# Patient Record
Sex: FEMALE | Race: WHITE | HISPANIC OR LATINO | Employment: FULL TIME | URBAN - METROPOLITAN AREA
[De-identification: names, ages, dates, MRNs, and addresses within clinical notes are randomized per-mention and may not be internally consistent; named-entity substitution may affect disease eponyms.]

---

## 2020-01-08 PROBLEM — F33.40 RECURRENT MAJOR DEPRESSIVE DISORDER, IN REMISSION (HCC): Status: ACTIVE | Noted: 2020-01-08

## 2020-05-20 PROBLEM — N84.1 CERVICAL POLYP: Status: ACTIVE | Noted: 2020-05-20

## 2020-05-20 PROBLEM — Z34.80 SUPERVISION OF OTHER NORMAL PREGNANCY, ANTEPARTUM: Status: ACTIVE | Noted: 2020-05-20

## 2020-07-29 PROBLEM — B37.31 RECURRENT CANDIDIASIS OF VAGINA: Status: ACTIVE | Noted: 2020-07-29

## 2020-09-23 PROBLEM — O47.00 PRETERM CONTRACTIONS: Status: ACTIVE | Noted: 2020-09-23

## 2020-09-25 ENCOUNTER — HOSPITAL ENCOUNTER (INPATIENT)
Facility: MEDICAL CENTER | Age: 30
LOS: 6 days | End: 2020-10-01
Attending: OBSTETRICS & GYNECOLOGY | Admitting: OBSTETRICS & GYNECOLOGY
Payer: COMMERCIAL

## 2020-09-25 DIAGNOSIS — F34.1 DYSTHYMIA: ICD-10-CM

## 2020-09-25 LAB
ABO + RH BLD: NORMAL
ABO GROUP BLD: NORMAL
ALBUMIN SERPL BCP-MCNC: 3.3 G/DL (ref 3.2–4.9)
ALBUMIN/GLOB SERPL: 0.9 G/DL
ALP SERPL-CCNC: 94 U/L (ref 30–99)
ALT SERPL-CCNC: 11 U/L (ref 2–50)
ANION GAP SERPL CALC-SCNC: 16 MMOL/L (ref 7–16)
AST SERPL-CCNC: 13 U/L (ref 12–45)
BASOPHILS # BLD AUTO: 0.2 % (ref 0–1.8)
BASOPHILS # BLD: 0.04 K/UL (ref 0–0.12)
BILIRUB SERPL-MCNC: 0.2 MG/DL (ref 0.1–1.5)
BLD GP AB SCN SERPL QL: NORMAL
BUN SERPL-MCNC: 9 MG/DL (ref 8–22)
CALCIUM SERPL-MCNC: 7.7 MG/DL (ref 8.5–10.5)
CHLORIDE SERPL-SCNC: 102 MMOL/L (ref 96–112)
CO2 SERPL-SCNC: 18 MMOL/L (ref 20–33)
COVID ORDER STATUS COVID19: NORMAL
CREAT SERPL-MCNC: 0.58 MG/DL (ref 0.5–1.4)
EOSINOPHIL # BLD AUTO: 0.01 K/UL (ref 0–0.51)
EOSINOPHIL NFR BLD: 0.1 % (ref 0–6.9)
ERYTHROCYTE [DISTWIDTH] IN BLOOD BY AUTOMATED COUNT: 44.8 FL (ref 35.9–50)
FIBRINOGEN PPP-MCNC: 698 MG/DL (ref 215–460)
GLOBULIN SER CALC-MCNC: 3.7 G/DL (ref 1.9–3.5)
GLUCOSE SERPL-MCNC: 136 MG/DL (ref 65–99)
HCT VFR BLD AUTO: 38.1 % (ref 37–47)
HGB BLD-MCNC: 13.2 G/DL (ref 12–16)
IMM GRANULOCYTES # BLD AUTO: 0.07 K/UL (ref 0–0.11)
IMM GRANULOCYTES NFR BLD AUTO: 0.4 % (ref 0–0.9)
LYMPHOCYTES # BLD AUTO: 0.86 K/UL (ref 1–4.8)
LYMPHOCYTES NFR BLD: 5.2 % (ref 22–41)
MAGNESIUM SERPL-MCNC: 4.3 MG/DL (ref 1.5–2.5)
MAGNESIUM SERPL-MCNC: 7 MG/DL (ref 1.5–2.5)
MAGNESIUM SERPL-MCNC: 8 MG/DL (ref 1.5–2.5)
MCH RBC QN AUTO: 33.2 PG (ref 27–33)
MCHC RBC AUTO-ENTMCNC: 34.6 G/DL (ref 33.6–35)
MCV RBC AUTO: 96 FL (ref 81.4–97.8)
MONOCYTES # BLD AUTO: 0.51 K/UL (ref 0–0.85)
MONOCYTES NFR BLD AUTO: 3.1 % (ref 0–13.4)
NEUTROPHILS # BLD AUTO: 15.12 K/UL (ref 2–7.15)
NEUTROPHILS NFR BLD: 91 % (ref 44–72)
NRBC # BLD AUTO: 0 K/UL
NRBC BLD-RTO: 0 /100 WBC
PLATELET # BLD AUTO: 192 K/UL (ref 164–446)
PMV BLD AUTO: 10.8 FL (ref 9–12.9)
POTASSIUM SERPL-SCNC: 3.7 MMOL/L (ref 3.6–5.5)
PROT SERPL-MCNC: 7 G/DL (ref 6–8.2)
RBC # BLD AUTO: 3.97 M/UL (ref 4.2–5.4)
RH BLD: NORMAL
SARS-COV-2 RDRP RESP QL NAA+PROBE: NOTDETECTED
SODIUM SERPL-SCNC: 136 MMOL/L (ref 135–145)
SPECIMEN SOURCE: NORMAL
WBC # BLD AUTO: 16.6 K/UL (ref 4.8–10.8)

## 2020-09-25 PROCEDURE — 302790 HCHG STAT ANTEPARTUM CARE, DAILY

## 2020-09-25 PROCEDURE — 700111 HCHG RX REV CODE 636 W/ 250 OVERRIDE (IP): Performed by: ADVANCED PRACTICE MIDWIFE

## 2020-09-25 PROCEDURE — C9803 HOPD COVID-19 SPEC COLLECT: HCPCS | Performed by: OBSTETRICS & GYNECOLOGY

## 2020-09-25 PROCEDURE — 700111 HCHG RX REV CODE 636 W/ 250 OVERRIDE (IP): Performed by: OBSTETRICS & GYNECOLOGY

## 2020-09-25 PROCEDURE — 85025 COMPLETE CBC W/AUTO DIFF WBC: CPT

## 2020-09-25 PROCEDURE — 770002 HCHG ROOM/CARE - OB PRIVATE (112)

## 2020-09-25 PROCEDURE — 86900 BLOOD TYPING SEROLOGIC ABO: CPT

## 2020-09-25 PROCEDURE — 86850 RBC ANTIBODY SCREEN: CPT

## 2020-09-25 PROCEDURE — A9270 NON-COVERED ITEM OR SERVICE: HCPCS | Performed by: OBSTETRICS & GYNECOLOGY

## 2020-09-25 PROCEDURE — 83735 ASSAY OF MAGNESIUM: CPT

## 2020-09-25 PROCEDURE — 85384 FIBRINOGEN ACTIVITY: CPT

## 2020-09-25 PROCEDURE — 700105 HCHG RX REV CODE 258: Performed by: OBSTETRICS & GYNECOLOGY

## 2020-09-25 PROCEDURE — 36415 COLL VENOUS BLD VENIPUNCTURE: CPT

## 2020-09-25 PROCEDURE — U0004 COV-19 TEST NON-CDC HGH THRU: HCPCS

## 2020-09-25 PROCEDURE — 80053 COMPREHEN METABOLIC PANEL: CPT

## 2020-09-25 PROCEDURE — 86901 BLOOD TYPING SEROLOGIC RH(D): CPT

## 2020-09-25 PROCEDURE — 700102 HCHG RX REV CODE 250 W/ 637 OVERRIDE(OP): Performed by: OBSTETRICS & GYNECOLOGY

## 2020-09-25 RX ORDER — ESCITALOPRAM OXALATE 10 MG/1
10 TABLET ORAL DAILY
Status: DISCONTINUED | OUTPATIENT
Start: 2020-09-25 | End: 2020-10-01 | Stop reason: HOSPADM

## 2020-09-25 RX ORDER — VITAMIN A ACETATE, BETA CAROTENE, ASCORBIC ACID, CHOLECALCIFEROL, .ALPHA.-TOCOPHEROL ACETATE, DL-, THIAMINE MONONITRATE, RIBOFLAVIN, NIACINAMIDE, PYRIDOXINE HYDROCHLORIDE, FOLIC ACID, CYANOCOBALAMIN, CALCIUM CARBONATE, FERROUS FUMARATE, ZINC OXIDE, CUPRIC OXIDE 3080; 12; 120; 400; 1; 1.84; 3; 20; 22; 920; 25; 200; 27; 10; 2 [IU]/1; UG/1; MG/1; [IU]/1; MG/1; MG/1; MG/1; MG/1; MG/1; [IU]/1; MG/1; MG/1; MG/1; MG/1; MG/1
1 TABLET, FILM COATED ORAL
Status: DISCONTINUED | OUTPATIENT
Start: 2020-09-25 | End: 2020-09-29

## 2020-09-25 RX ORDER — MAGNESIUM SULFATE HEPTAHYDRATE 40 MG/ML
INJECTION, SOLUTION INTRAVENOUS
Status: ACTIVE
Start: 2020-09-25 | End: 2020-09-25

## 2020-09-25 RX ORDER — SODIUM CHLORIDE, SODIUM LACTATE, POTASSIUM CHLORIDE, CALCIUM CHLORIDE 600; 310; 30; 20 MG/100ML; MG/100ML; MG/100ML; MG/100ML
INJECTION, SOLUTION INTRAVENOUS CONTINUOUS
Status: DISCONTINUED | OUTPATIENT
Start: 2020-09-25 | End: 2020-09-29

## 2020-09-25 RX ORDER — DOCUSATE SODIUM 100 MG/1
100 CAPSULE, LIQUID FILLED ORAL 2 TIMES DAILY
Status: DISCONTINUED | OUTPATIENT
Start: 2020-09-25 | End: 2020-09-29

## 2020-09-25 RX ORDER — ONDANSETRON 2 MG/ML
4 INJECTION INTRAMUSCULAR; INTRAVENOUS EVERY 6 HOURS PRN
Status: DISCONTINUED | OUTPATIENT
Start: 2020-09-25 | End: 2020-09-29

## 2020-09-25 RX ORDER — CALCIUM GLUCONATE 94 MG/ML
1 INJECTION, SOLUTION INTRAVENOUS
Status: DISCONTINUED | OUTPATIENT
Start: 2020-09-25 | End: 2020-09-29

## 2020-09-25 RX ORDER — SODIUM CHLORIDE, SODIUM LACTATE, POTASSIUM CHLORIDE, CALCIUM CHLORIDE 600; 310; 30; 20 MG/100ML; MG/100ML; MG/100ML; MG/100ML
INJECTION, SOLUTION INTRAVENOUS
Status: ACTIVE
Start: 2020-09-25 | End: 2020-09-25

## 2020-09-25 RX ORDER — INDOMETHACIN 50 MG/1
50 CAPSULE ORAL EVERY 8 HOURS
Status: COMPLETED | OUTPATIENT
Start: 2020-09-25 | End: 2020-09-28

## 2020-09-25 RX ORDER — MAGNESIUM SULFATE HEPTAHYDRATE 40 MG/ML
1.5 INJECTION, SOLUTION INTRAVENOUS CONTINUOUS
Status: DISCONTINUED | OUTPATIENT
Start: 2020-09-25 | End: 2020-09-27

## 2020-09-25 RX ORDER — ACETAMINOPHEN 325 MG/1
650 TABLET ORAL EVERY 4 HOURS PRN
Status: DISCONTINUED | OUTPATIENT
Start: 2020-09-25 | End: 2020-09-29

## 2020-09-25 RX ORDER — BETAMETHASONE SODIUM PHOSPHATE AND BETAMETHASONE ACETATE 3; 3 MG/ML; MG/ML
12 INJECTION, SUSPENSION INTRA-ARTICULAR; INTRALESIONAL; INTRAMUSCULAR; SOFT TISSUE ONCE
Status: COMPLETED | OUTPATIENT
Start: 2020-09-26 | End: 2020-09-26

## 2020-09-25 RX ORDER — SODIUM CHLORIDE, SODIUM LACTATE, POTASSIUM CHLORIDE, CALCIUM CHLORIDE 600; 310; 30; 20 MG/100ML; MG/100ML; MG/100ML; MG/100ML
1000 INJECTION, SOLUTION INTRAVENOUS CONTINUOUS
Status: DISCONTINUED | OUTPATIENT
Start: 2020-09-25 | End: 2020-09-25

## 2020-09-25 RX ADMIN — MAGNESIUM SULFATE HEPTAHYDRATE 2 G/HR: 40 INJECTION, SOLUTION INTRAVENOUS at 18:32

## 2020-09-25 RX ADMIN — INDOMETHACIN 50 MG: 50 CAPSULE ORAL at 21:56

## 2020-09-25 RX ADMIN — DOCUSATE SODIUM 100 MG: 100 CAPSULE, LIQUID FILLED ORAL at 17:07

## 2020-09-25 RX ADMIN — MAGNESIUM SULFATE HEPTAHYDRATE 3 G/HR: 40 INJECTION, SOLUTION INTRAVENOUS at 06:10

## 2020-09-25 RX ADMIN — SODIUM CHLORIDE, POTASSIUM CHLORIDE, SODIUM LACTATE AND CALCIUM CHLORIDE 1000 ML: 600; 310; 30; 20 INJECTION, SOLUTION INTRAVENOUS at 06:11

## 2020-09-25 RX ADMIN — INDOMETHACIN 50 MG: 50 CAPSULE ORAL at 17:07

## 2020-09-25 RX ADMIN — VANCOMYCIN HYDROCHLORIDE 1000 MG: 500 INJECTION, POWDER, LYOPHILIZED, FOR SOLUTION INTRAVENOUS at 15:08

## 2020-09-25 RX ADMIN — ESCITALOPRAM OXALATE 10 MG: 10 TABLET ORAL at 20:32

## 2020-09-25 SDOH — ECONOMIC STABILITY: TRANSPORTATION INSECURITY
IN THE PAST 12 MONTHS, HAS LACK OF TRANSPORTATION KEPT YOU FROM MEETINGS, WORK, OR FROM GETTING THINGS NEEDED FOR DAILY LIVING?: NO

## 2020-09-25 SDOH — ECONOMIC STABILITY: FOOD INSECURITY: WITHIN THE PAST 12 MONTHS, YOU WORRIED THAT YOUR FOOD WOULD RUN OUT BEFORE YOU GOT MONEY TO BUY MORE.: NEVER TRUE

## 2020-09-25 SDOH — ECONOMIC STABILITY: FOOD INSECURITY: WITHIN THE PAST 12 MONTHS, THE FOOD YOU BOUGHT JUST DIDN'T LAST AND YOU DIDN'T HAVE MONEY TO GET MORE.: NEVER TRUE

## 2020-09-25 SDOH — ECONOMIC STABILITY: TRANSPORTATION INSECURITY
IN THE PAST 12 MONTHS, HAS THE LACK OF TRANSPORTATION KEPT YOU FROM MEDICAL APPOINTMENTS OR FROM GETTING MEDICATIONS?: NO

## 2020-09-25 ASSESSMENT — LIFESTYLE VARIABLES
ALCOHOL_USE: NO
ON A TYPICAL DAY WHEN YOU DRINK ALCOHOL HOW MANY DRINKS DO YOU HAVE: 0
EVER FELT BAD OR GUILTY ABOUT YOUR DRINKING: NO
HAVE YOU EVER FELT YOU SHOULD CUT DOWN ON YOUR DRINKING: NO
EVER HAD A DRINK FIRST THING IN THE MORNING TO STEADY YOUR NERVES TO GET RID OF A HANGOVER: NO
AVERAGE NUMBER OF DAYS PER WEEK YOU HAVE A DRINK CONTAINING ALCOHOL: 0
HAVE PEOPLE ANNOYED YOU BY CRITICIZING YOUR DRINKING: NO
TOTAL SCORE: 0
HOW MANY TIMES IN THE PAST YEAR HAVE YOU HAD 5 OR MORE DRINKS IN A DAY: 0
EVER_SMOKED: NEVER
TOTAL SCORE: 0
CONSUMPTION TOTAL: NEGATIVE
TOTAL SCORE: 0

## 2020-09-25 ASSESSMENT — PATIENT HEALTH QUESTIONNAIRE - PHQ9
2. FEELING DOWN, DEPRESSED, IRRITABLE, OR HOPELESS: NOT AT ALL
SUM OF ALL RESPONSES TO PHQ9 QUESTIONS 1 AND 2: 0
SUM OF ALL RESPONSES TO PHQ9 QUESTIONS 1 AND 2: 0
2. FEELING DOWN, DEPRESSED, IRRITABLE, OR HOPELESS: NOT AT ALL
1. LITTLE INTEREST OR PLEASURE IN DOING THINGS: NOT AT ALL
1. LITTLE INTEREST OR PLEASURE IN DOING THINGS: NOT AT ALL

## 2020-09-25 ASSESSMENT — COPD QUESTIONNAIRES
COPD SCREENING SCORE: 0
DURING THE PAST 4 WEEKS HOW MUCH DID YOU FEEL SHORT OF BREATH: NONE/LITTLE OF THE TIME
IN THE PAST 12 MONTHS DO YOU DO LESS THAN YOU USED TO BECAUSE OF YOUR BREATHING PROBLEMS: DISAGREE/UNSURE
HAVE YOU SMOKED AT LEAST 100 CIGARETTES IN YOUR ENTIRE LIFE: NO/DON'T KNOW
DO YOU EVER COUGH UP ANY MUCUS OR PHLEGM?: NO/ONLY WITH OCCASIONAL COLDS OR INFECTIONS

## 2020-09-25 ASSESSMENT — PAIN DESCRIPTION - PAIN TYPE
TYPE: ACUTE PAIN
TYPE: ACUTE PAIN

## 2020-09-25 NOTE — PROGRESS NOTES
0520: Patient of Dr. Wolf's comes in via [employer] flight from [employer] transport C/O vaginal bleeding with contractions Q10 min apart. . Sood Gestation today at 25.5 weeks.     Patient arrives and is brought by gurney to S215 - reports contractions/cramping Q4min, bleeding on chux pad with dark small clots noted. Amnisure attempted but gross pooling found in vaginal vault during sterile speculum exam.     Magnesium 3g maintenance dose ordered - see MAR. ABX ordered; pharmacy to dose - see MAR.     Dr. Wolf notified by phone and on his way to hospital to see patient.     0686: Report given to Whitney WELSH RN.

## 2020-09-25 NOTE — PROGRESS NOTES
0700- Bedside report received from Maribel RN- poc discussed  0715- pt resting stable on mag at this time, having small amount of bleeding still, looks like small amount of clear fluid with the blood  0745- Dr. Wolf at bedside will continue to watch  0945- pt felt like needing to have a BM, unable to have one, clear fluid with blood tinge  1400- Report given to Jina NAYAK- poc discussed

## 2020-09-25 NOTE — H&P
DATE OF ADMISSION:  2020     REASON FOR TRANSPORT:  Placental abruption,  labor.      REFERRING PHYSICIAN:  Dr. Nowak.      REFERRING INSTITUTION:  [facility].      HISTORY OF PRESENT ILLNESS:  This is a 30-year-old , AB1, EDC 2021,   currently 25 weeks and 5 days, transferred for probable placental abruption   with secondary  labor.  Patient reports that her prenatal course had   been unremarkable until  at which time she began to have some pelvic   cramping and spotting, was seen at the hospital and observed and subsequently   discharged.  She was advised to drink water and rest.  Patient reports   yesterday throughout most of the day, she had increasing symptoms and felt   that she was marco a approximately 10 times per hour.  Bleeding increased   at approximately 9:30-10:00 last night as well as the contractions and she   presented to the hospital.  She was seen by Dr. Nowak and diagnosis of   placental abruption was made and patient is vertex and transport was   requested.  She was initiated on IV magnesium sulfate.  First dose   betamethasone was administered.  Vancomycin was administered for GBS   prophylaxis.      Transport occurred without incident.  Patient arrived safely.  Upon arrival,   still having bleeding, but appears to have decreased significantly.      PAST MEDICAL HISTORY:  She denies any major medical problems other than   history of depression.  She denies any history of asthma, seizures,   hypertension, cardiovascular, GI or  diseases.      PREVIOUS OPERATIONS:  As a child, she had an inguinal hernia repair.      TRANSFUSIONS:  None.      ALLERGIES:  AMOXICILLIN CAUSES A RASH.  BACTRIM CAUSES A RASH.      VENEREAL DISEASE HISTORY:  Negative.      HABITS:  None.      CURRENT MEDICATIONS:  Lexapro 10 for depression.  She is trying to wean   herself off the Lexapro.      PAST OBSTETRICAL HISTORY:  In , she had a spontaneous AB at 10 weeks.       PHYSICAL EXAMINATION:    VITAL SIGNS:  All within normal limits.  She is afebrile.  Pulse 82.    HEAD:  Normocephalic.    EYES:  No scleral icterus or subconjunctival pallor.  Pupils equal, react to   light and accommodation.  Extraocular movements symmetrical.    EAR, NOSE AND THROAT:  Grossly within normal limits.    LUNGS:  Clear.    HEART:  Regular rate and rhythm.  Normal S1 and S2.  No S3, S4 or murmurs.    ABDOMEN:  Soft, gravid uterus.  Minimal tenderness.    EXTREMITIES:  No edema or varicosities.    PELVIC:  Exam by the RN on duty was 1 cm and 80% effaced.      ASSESSMENT:    1.  Intrauterine pregnancy 25 weeks and 5 days.    2.  Vaginal bleeding secondary to placental abruption.    3.   labor, probably secondary to placental abruption.      PLAN:  At the present time, stabilize the patient and primary goal is to   complete the steroid window.  We discussed the role of magnesium sulfate for   neuro protection, but also secondary benefit for tocolysis.      We discussed that she may be here for long-term; however, it is difficult to   predict at this time.  We will take this in a step by step process and we will   proceed with neonatology consultation later today.  All questions answered to   their satisfaction.    Time: 100 minutes       ____________________________________     MD CHEN WOLFE / NOEMÍ    DD:  2020 08:19:47  DT:  2020 08:46:18    D#:  [id number]  Job#:  [id number]

## 2020-09-25 NOTE — PROGRESS NOTES
EDC - 1/3/21 EGA - 25.5    1400 - Report received from SELINA Horner RN. POC discussed, care assumed.   1515 - admit profile completed. Pt reports feeling leaking, upon inspection, approx 100ml jadiel dark red blood noted on pad and pt perineum pericare provided with new pad.  1528 - text to Dr. Wolf-awaiting return.  1540 - Hereb from Lab called with critical result of Magnesium at 8.0. Critical lab result read back to Hereb.   Dr. Wolf called to notify of critical lab result at 1541. Unable to leave message-mailbox full    1600 -Call to answering service to reach Dr. Wolf for update-answering service unable to reach physician.  1608 - MINNIE Wolf CNM called and given update on pt labs, bleeding and pressure. Orders received.  1619 - Dr. Wolf returned text- MD to come to bedside in 15 min.   1655 - Dr. Wolf at bedside. SVE=1/90/0. Monitors removed for bedside US. Orders received for indocin and to turn off magnesium for 30min.  1721 - pt transferred to S2 via bed in stable condition with FOB and personal belongings.   1725 - Magnesium infusion restarted  1830 - pt resting in bed. No complaints at this time.   185 - report given to GABY Arthur RN. POC discussed

## 2020-09-26 LAB
MAGNESIUM SERPL-MCNC: 6.2 MG/DL (ref 1.5–2.5)
MAGNESIUM SERPL-MCNC: 6.3 MG/DL (ref 1.5–2.5)

## 2020-09-26 PROCEDURE — 36415 COLL VENOUS BLD VENIPUNCTURE: CPT

## 2020-09-26 PROCEDURE — 770002 HCHG ROOM/CARE - OB PRIVATE (112)

## 2020-09-26 PROCEDURE — 83735 ASSAY OF MAGNESIUM: CPT

## 2020-09-26 PROCEDURE — 700111 HCHG RX REV CODE 636 W/ 250 OVERRIDE (IP): Performed by: OBSTETRICS & GYNECOLOGY

## 2020-09-26 PROCEDURE — 700105 HCHG RX REV CODE 258: Performed by: OBSTETRICS & GYNECOLOGY

## 2020-09-26 PROCEDURE — 700102 HCHG RX REV CODE 250 W/ 637 OVERRIDE(OP): Performed by: OBSTETRICS & GYNECOLOGY

## 2020-09-26 PROCEDURE — 302790 HCHG STAT ANTEPARTUM CARE, DAILY

## 2020-09-26 PROCEDURE — A9270 NON-COVERED ITEM OR SERVICE: HCPCS | Performed by: OBSTETRICS & GYNECOLOGY

## 2020-09-26 RX ADMIN — MAGNESIUM SULFATE HEPTAHYDRATE 1.5 G/HR: 40 INJECTION, SOLUTION INTRAVENOUS at 18:40

## 2020-09-26 RX ADMIN — INDOMETHACIN 50 MG: 50 CAPSULE ORAL at 06:14

## 2020-09-26 RX ADMIN — PRENATAL WITH FERROUS FUM AND FOLIC ACID 1 TABLET: 3080; 920; 120; 400; 22; 1.84; 3; 20; 10; 1; 12; 200; 27; 25; 2 TABLET ORAL at 08:56

## 2020-09-26 RX ADMIN — DOCUSATE SODIUM 100 MG: 100 CAPSULE, LIQUID FILLED ORAL at 06:14

## 2020-09-26 RX ADMIN — INDOMETHACIN 50 MG: 50 CAPSULE ORAL at 22:02

## 2020-09-26 RX ADMIN — INDOMETHACIN 50 MG: 50 CAPSULE ORAL at 14:10

## 2020-09-26 RX ADMIN — DOCUSATE SODIUM 100 MG: 100 CAPSULE, LIQUID FILLED ORAL at 18:02

## 2020-09-26 RX ADMIN — SODIUM CHLORIDE, POTASSIUM CHLORIDE, SODIUM LACTATE AND CALCIUM CHLORIDE: 600; 310; 30; 20 INJECTION, SOLUTION INTRAVENOUS at 18:40

## 2020-09-26 RX ADMIN — VANCOMYCIN HYDROCHLORIDE 1000 MG: 500 INJECTION, POWDER, LYOPHILIZED, FOR SOLUTION INTRAVENOUS at 04:06

## 2020-09-26 RX ADMIN — BETAMETHASONE SODIUM PHOSPHATE AND BETAMETHASONE ACETATE 12 MG: 3; 3 INJECTION, SUSPENSION INTRA-ARTICULAR; INTRALESIONAL; INTRAMUSCULAR at 02:51

## 2020-09-26 RX ADMIN — ESCITALOPRAM OXALATE 10 MG: 10 TABLET ORAL at 22:02

## 2020-09-26 RX ADMIN — SODIUM CHLORIDE, POTASSIUM CHLORIDE, SODIUM LACTATE AND CALCIUM CHLORIDE: 600; 310; 30; 20 INJECTION, SOLUTION INTRAVENOUS at 01:39

## 2020-09-26 ASSESSMENT — PAIN DESCRIPTION - PAIN TYPE: TYPE: REFERRED PAIN

## 2020-09-26 NOTE — PROGRESS NOTES
1300 Report received from JUSTUS Eng RN at bedside and assumed care. POC discussed with pt and s/o and encouraged to state needs or questions at any time.    1900 Report given to CHARO Burch RN at bedside

## 2020-09-26 NOTE — PROGRESS NOTES
S:  Pt reporting more cramping and bleeding this afternoon.    O: Afebrile        Cx: 1 cm/90%/0, vertex.         ULTRASOUND: Single live IUP, vertex, female,  gm.  Posterior placenta.  Fetal anatomy appropriate for age.  [place] 7.5 cm.  BPP 8/8.       EFM: Minimal variability.  No decelerations.  Accelerations appropriate for age.  Irregular contractions.    A: IUP 25 5/7 weeks      Placenta abruption.  P: Will add indomethacin 50 mg every 8 hours.       Continue with close observation.    Time: 15 minutes.

## 2020-09-26 NOTE — PROGRESS NOTES
"Late Entry  185) Report received from JUSTUS Eng RN, POC discussed, assumed care of patient at this time  1930) Assessment performed. Patient is a  EDC 1/3 which makes her 25.5 weeks. Patient denies any vaginal bleeding since the 1400, LOF or bleeding. Patient is having intermittent contractions and is feeling one every hour. Patient has no abdomen tenderness. States positive fetal movement.  ) Manju from lab called with a critical magnesium result of 7.0, results read back to lab to verify  ) Report to Dr. Wolf concerning patient's mag level, orders received to decrease magnesium from 2g/hr to 1.5g/hr, orders verified with MD and carried out. Patient updated concerning POC and verbalizes understanding.  ) Patient resting on and off in bed with no s/s of distress noted, respirations even and unlabored, safety precautions remain in place  5) Manju from lab called with a critical magnesium result of 6.3, results read back to lab and now within parameters per Dr. Wolf  0251) Second betamethasone injection administered per MD order, patient tolerated injection well, ice pack provided for right vastus lateralis. Scant amount of dark red blood noted on perineum, becky care provided, pads changed.   0510) Patient has had two episodes of leaking urine while still having catheter in place. Pads notably soaked with urine x 2. Patient states \"I feel like I have to pee and then I release and feel it coming out\", patient also states this has been happening ever since catheter was placed. will update MD in AM  0700) Report to JUSTUS Eng RN, POC discussed.  "

## 2020-09-26 NOTE — PROGRESS NOTES
EDC - 1/3/21 EGA - 25.6    0700 - Report received from GABY Arthur RN. POC discussed, care assumed.   0720 - Brooke from Lab called with critical result of Magnesium at 6.2. Critical lab result read back to Brooke.   This critical lab result is within parameters established by  for this patient  0754 -  Full Assessment complete. VSS. No complaints of contractions, ROM, at this time. Pt reports occasional small amount of dark red blood Pt reports good FM. POC discussed with pt and family members, all questions answered.   0900 - pt resting comfortably at this time. No needs or complaints.   1132 - EFM adjusted. VSS. Pt denies cramping, bleeding at this time. Reports good FM.  1300 - report given to GALI Prajapati RN. POC discussed.

## 2020-09-26 NOTE — PROGRESS NOTES
S: No complaints.  No bleeding at this time and no contractions.  O: Afebrile       BP: 107/63 mmHg       EFM: Moderate variability.  Accelerations present.  No contractions.  A: IUP 25 6/7 weeks      Placental abruption       labor, stable.  P: D/C magnesium tomorrow.    Time: 15 minutes.

## 2020-09-26 NOTE — CARE PLAN
Problem: Risk for Infection, Impaired Wound Healing  Goal: Remain free from signs and symptoms of infection  Note: Assessing and monitoring patient for s/s of infection and implementing precautions as needed. Educating patient and family concerning importance of hand hygiene     Problem: Pain  Goal: Alleviation of Pain or a reduction in pain to the patient's comfort goal  Outcome: PROGRESSING AS EXPECTED  Note: Assessing and monitoring patient's pain and implementing pharmacologic and nonpharmacologic means of pain management as needed. Educating patient concerning pain management options

## 2020-09-27 PROCEDURE — 770002 HCHG ROOM/CARE - OB PRIVATE (112)

## 2020-09-27 PROCEDURE — 59025 FETAL NON-STRESS TEST: CPT

## 2020-09-27 PROCEDURE — A9270 NON-COVERED ITEM OR SERVICE: HCPCS | Performed by: OBSTETRICS & GYNECOLOGY

## 2020-09-27 PROCEDURE — 700102 HCHG RX REV CODE 250 W/ 637 OVERRIDE(OP): Performed by: OBSTETRICS & GYNECOLOGY

## 2020-09-27 PROCEDURE — 302790 HCHG STAT ANTEPARTUM CARE, DAILY

## 2020-09-27 RX ORDER — CALCIUM CARBONATE 500 MG/1
1000 TABLET, CHEWABLE ORAL 4 TIMES DAILY PRN
Status: DISCONTINUED | OUTPATIENT
Start: 2020-09-27 | End: 2020-09-29 | Stop reason: HOSPADM

## 2020-09-27 RX ADMIN — PRENATAL WITH FERROUS FUM AND FOLIC ACID 1 TABLET: 3080; 920; 120; 400; 22; 1.84; 3; 20; 10; 1; 12; 200; 27; 25; 2 TABLET ORAL at 09:47

## 2020-09-27 RX ADMIN — ANTACID TABLETS 1000 MG: 500 TABLET, CHEWABLE ORAL at 23:31

## 2020-09-27 RX ADMIN — ESCITALOPRAM OXALATE 10 MG: 10 TABLET ORAL at 20:39

## 2020-09-27 RX ADMIN — INDOMETHACIN 50 MG: 50 CAPSULE ORAL at 14:19

## 2020-09-27 RX ADMIN — INDOMETHACIN 50 MG: 50 CAPSULE ORAL at 21:52

## 2020-09-27 RX ADMIN — INDOMETHACIN 50 MG: 50 CAPSULE ORAL at 05:13

## 2020-09-27 RX ADMIN — DOCUSATE SODIUM 100 MG: 100 CAPSULE, LIQUID FILLED ORAL at 18:30

## 2020-09-27 RX ADMIN — DOCUSATE SODIUM 100 MG: 100 CAPSULE, LIQUID FILLED ORAL at 05:13

## 2020-09-27 NOTE — PROGRESS NOTES
S: No complaints.  No active bleeding.  O: Afebrile       EFM: Baseline 130 bpm, moderate variability, accelerations present.  A: IUP 26 weeks.      Placenta abruption       labor, resolved.  P: D/C magnesium sulfate.       D/C george.    Time: 15 minutes

## 2020-09-27 NOTE — CARE PLAN
Problem: Pain  Goal: Alleviation of Pain or a reduction in pain to the patient's comfort goal  Outcome: PROGRESSING AS EXPECTED  Note: Patient denies any pain or contractions. Patient educated to notify RN if any pain or contractions develop      Problem: Infection  Goal: Will remain free from infection  Outcome: PROGRESSING AS EXPECTED  Note: Patient remains afebrile. No S&S of infections

## 2020-09-27 NOTE — PROGRESS NOTES
0700- report received from GALI Tobar RN. Plan of care discussed.     0730- Dr Wolf at bedside. Order received to stop magnesium. Patient and  agree with plan of care.  magnesium discontinued    0810- assessment done. Patient denies any contractions/cramping or any leaking of fluid. Patient had two quarter size dark brown blood on her pad. Mcleod discontinued. Yesenia care done. Patient assisted to bathroom.     1145- patient sleeping soundly. Patient not disturbed.     1230- patient sleeping soundly. Patient not disturbed    1315- patient sleeping soundly. Patient not disturbed    1420- patient awake.denies any cramping.

## 2020-09-27 NOTE — PROGRESS NOTES
Late entry: Summary of events:    1850- Received report from GAIL Prajapati RN. Assumed care of pt. Pt denies needs at this time. Encouraged to call with needs. Will monitor.    2137- Pt assessed.  WDL. Pt denies UC's, cramping, bright red vaginal bleeding, states + fetal movement. EFM and TOCO in placed.     2346- Uterine irritability and recurrent variables noted as well as minimal variability. Dr. Wolf updated. Order to check pts cervix received. SVE 1/90/-1. Dr. Wolf updated. Orders for continuous monitoring and call Dr. Wolf if variability does not improve received.    0108- Dr. Wolf notified of prolonged deceleration. Orders to continue to monitor pt received.

## 2020-09-28 PROCEDURE — 700102 HCHG RX REV CODE 250 W/ 637 OVERRIDE(OP): Performed by: OBSTETRICS & GYNECOLOGY

## 2020-09-28 PROCEDURE — A9270 NON-COVERED ITEM OR SERVICE: HCPCS | Performed by: OBSTETRICS & GYNECOLOGY

## 2020-09-28 PROCEDURE — 302790 HCHG STAT ANTEPARTUM CARE, DAILY

## 2020-09-28 PROCEDURE — 770002 HCHG ROOM/CARE - OB PRIVATE (112)

## 2020-09-28 RX ORDER — ALUMINA, MAGNESIA, AND SIMETHICONE 2400; 2400; 240 MG/30ML; MG/30ML; MG/30ML
10 SUSPENSION ORAL 4 TIMES DAILY PRN
Status: DISCONTINUED | OUTPATIENT
Start: 2020-09-28 | End: 2020-10-01 | Stop reason: HOSPADM

## 2020-09-28 RX ADMIN — ESCITALOPRAM OXALATE 10 MG: 10 TABLET ORAL at 21:24

## 2020-09-28 RX ADMIN — PRENATAL WITH FERROUS FUM AND FOLIC ACID 1 TABLET: 3080; 920; 120; 400; 22; 1.84; 3; 20; 10; 1; 12; 200; 27; 25; 2 TABLET ORAL at 12:50

## 2020-09-28 RX ADMIN — DOCUSATE SODIUM 100 MG: 100 CAPSULE, LIQUID FILLED ORAL at 06:03

## 2020-09-28 RX ADMIN — DOCUSATE SODIUM 100 MG: 100 CAPSULE, LIQUID FILLED ORAL at 17:50

## 2020-09-28 RX ADMIN — ANTACID TABLETS 1000 MG: 500 TABLET, CHEWABLE ORAL at 14:27

## 2020-09-28 RX ADMIN — ANTACID TABLETS 1000 MG: 500 TABLET, CHEWABLE ORAL at 19:49

## 2020-09-28 RX ADMIN — INDOMETHACIN 50 MG: 50 CAPSULE ORAL at 06:03

## 2020-09-28 RX ADMIN — ALUMINUM HYDROXIDE, MAGNESIUM HYDROXIDE, AND DIMETHICONE 10 ML: 400; 400; 40 SUSPENSION ORAL at 23:56

## 2020-09-28 NOTE — PROGRESS NOTES
0700: Assumed care of patient, Report Received from KARSTEN Mercado RN. Sood, Gestation 26.0.     Patient in bed, awake, RN at bedside. Assessment complete. Patient denies questions regarding care [facility] Dry erase board updated with RN contact information, discussed. Patient encouraged to call RN with all questions, concerns and needs.     1940: Pt calls out for monitoring. Monitors applied x2.     2040: Patient FHR monitoring complete - patient denies further needs at this time.     0700: Report given to KIMANI Jones RN.

## 2020-09-28 NOTE — CARE PLAN
Problem: Safety  Goal: Free from accidental injury  Outcome: PROGRESSING AS EXPECTED  Right medication and patient armband scanned prior to administration. Patient's family instructed to notify RN of any spills, wires or anything unsafe in room.       Problem: Knowledge Deficit  Goal: Patient/Support Person demonstrates understanding regarding condition, prognosis and treatment needs during the pregnancy  Outcome: PROGRESSING AS EXPECTED  RN discussed plan of care and addressed any questions or concerns.

## 2020-09-28 NOTE — PROGRESS NOTES
"0700 - Report from JOSELITO Parham RN. Pt sleeping at this time  1258 - EFM and toco applied for 1 hour monitoring  1500 - Per Dr Wolf, pt may shower  1750 - Pt without complaints. Denies [facility], LOF. Pt states that when she showered, she had \"3 tiny drops\" of old, dark blood from vagina, nothing since. + FM.   1900 - Report to ROMAIN Street RN  "

## 2020-09-29 LAB
ABO GROUP BLD: NORMAL
BLD GP AB SCN SERPL QL: NORMAL
ERYTHROCYTE [DISTWIDTH] IN BLOOD BY AUTOMATED COUNT: 45.6 FL (ref 35.9–50)
HCT VFR BLD AUTO: 34.6 % (ref 37–47)
HGB BLD-MCNC: 11.6 G/DL (ref 12–16)
MAGNESIUM SERPL-MCNC: 3.8 MG/DL (ref 1.5–2.5)
MCH RBC QN AUTO: 32.8 PG (ref 27–33)
MCHC RBC AUTO-ENTMCNC: 33.5 G/DL (ref 33.6–35)
MCV RBC AUTO: 97.7 FL (ref 81.4–97.8)
PATHOLOGY CONSULT NOTE: NORMAL
PLATELET # BLD AUTO: 158 K/UL (ref 164–446)
PMV BLD AUTO: 11.2 FL (ref 9–12.9)
RBC # BLD AUTO: 3.54 M/UL (ref 4.2–5.4)
RH BLD: NORMAL
WBC # BLD AUTO: 16.2 K/UL (ref 4.8–10.8)

## 2020-09-29 PROCEDURE — 88305 TISSUE EXAM BY PATHOLOGIST: CPT

## 2020-09-29 PROCEDURE — 86901 BLOOD TYPING SEROLOGIC RH(D): CPT

## 2020-09-29 PROCEDURE — 700111 HCHG RX REV CODE 636 W/ 250 OVERRIDE (IP): Performed by: OBSTETRICS & GYNECOLOGY

## 2020-09-29 PROCEDURE — 700111 HCHG RX REV CODE 636 W/ 250 OVERRIDE (IP)

## 2020-09-29 PROCEDURE — 85027 COMPLETE CBC AUTOMATED: CPT

## 2020-09-29 PROCEDURE — 86900 BLOOD TYPING SEROLOGIC ABO: CPT

## 2020-09-29 PROCEDURE — 83735 ASSAY OF MAGNESIUM: CPT

## 2020-09-29 PROCEDURE — 59409 OBSTETRICAL CARE: CPT

## 2020-09-29 PROCEDURE — 700102 HCHG RX REV CODE 250 W/ 637 OVERRIDE(OP): Performed by: OBSTETRICS & GYNECOLOGY

## 2020-09-29 PROCEDURE — A9270 NON-COVERED ITEM OR SERVICE: HCPCS | Performed by: OBSTETRICS & GYNECOLOGY

## 2020-09-29 PROCEDURE — 770002 HCHG ROOM/CARE - OB PRIVATE (112)

## 2020-09-29 PROCEDURE — 10D17Z9 MANUAL EXTRACTION OF PRODUCTS OF CONCEPTION, RETAINED, VIA NATURAL OR ARTIFICIAL OPENING: ICD-10-PCS | Performed by: OBSTETRICS & GYNECOLOGY

## 2020-09-29 PROCEDURE — 86850 RBC ANTIBODY SCREEN: CPT

## 2020-09-29 PROCEDURE — 36415 COLL VENOUS BLD VENIPUNCTURE: CPT

## 2020-09-29 PROCEDURE — 700105 HCHG RX REV CODE 258: Performed by: OBSTETRICS & GYNECOLOGY

## 2020-09-29 PROCEDURE — 304965 HCHG RECOVERY SERVICES

## 2020-09-29 RX ORDER — DOCUSATE SODIUM 100 MG/1
100 CAPSULE, LIQUID FILLED ORAL 2 TIMES DAILY PRN
Status: DISCONTINUED | OUTPATIENT
Start: 2020-09-29 | End: 2020-10-01 | Stop reason: HOSPADM

## 2020-09-29 RX ORDER — MAGNESIUM SULFATE HEPTAHYDRATE 40 MG/ML
2 INJECTION, SOLUTION INTRAVENOUS CONTINUOUS
Status: DISCONTINUED | OUTPATIENT
Start: 2020-09-29 | End: 2020-09-29

## 2020-09-29 RX ORDER — BISACODYL 10 MG
10 SUPPOSITORY, RECTAL RECTAL PRN
Status: DISCONTINUED | OUTPATIENT
Start: 2020-09-29 | End: 2020-10-01 | Stop reason: HOSPADM

## 2020-09-29 RX ORDER — DEXTROSE, SODIUM CHLORIDE, SODIUM LACTATE, POTASSIUM CHLORIDE, AND CALCIUM CHLORIDE 5; .6; .31; .03; .02 G/100ML; G/100ML; G/100ML; G/100ML; G/100ML
INJECTION, SOLUTION INTRAVENOUS CONTINUOUS
Status: DISCONTINUED | OUTPATIENT
Start: 2020-09-29 | End: 2020-10-01 | Stop reason: HOSPADM

## 2020-09-29 RX ORDER — IBUPROFEN 600 MG/1
600 TABLET ORAL EVERY 6 HOURS PRN
Status: DISCONTINUED | OUTPATIENT
Start: 2020-09-29 | End: 2020-10-01 | Stop reason: HOSPADM

## 2020-09-29 RX ORDER — SODIUM CHLORIDE, SODIUM LACTATE, POTASSIUM CHLORIDE, CALCIUM CHLORIDE 600; 310; 30; 20 MG/100ML; MG/100ML; MG/100ML; MG/100ML
INJECTION, SOLUTION INTRAVENOUS PRN
Status: DISCONTINUED | OUTPATIENT
Start: 2020-09-29 | End: 2020-10-01 | Stop reason: HOSPADM

## 2020-09-29 RX ORDER — MAGNESIUM SULFATE HEPTAHYDRATE 40 MG/ML
4 INJECTION, SOLUTION INTRAVENOUS ONCE
Status: COMPLETED | OUTPATIENT
Start: 2020-09-29 | End: 2020-09-29

## 2020-09-29 RX ORDER — METHYLERGONOVINE MALEATE 0.2 MG/ML
0.2 INJECTION INTRAVENOUS
Status: DISCONTINUED | OUTPATIENT
Start: 2020-09-29 | End: 2020-10-01 | Stop reason: HOSPADM

## 2020-09-29 RX ORDER — METHYLERGONOVINE MALEATE 0.2 MG/ML
INJECTION INTRAVENOUS
Status: COMPLETED
Start: 2020-09-29 | End: 2020-09-29

## 2020-09-29 RX ORDER — MAGNESIUM SULFATE HEPTAHYDRATE 40 MG/ML
2 INJECTION, SOLUTION INTRAVENOUS ONCE
Status: COMPLETED | OUTPATIENT
Start: 2020-09-29 | End: 2020-09-29

## 2020-09-29 RX ADMIN — METHYLERGONOVINE MALEATE 0.2 MG: 0.2 INJECTION, SOLUTION INTRAMUSCULAR; INTRAVENOUS at 04:20

## 2020-09-29 RX ADMIN — MAGNESIUM SULFATE IN WATER 4 G: 40 INJECTION, SOLUTION INTRAVENOUS at 02:01

## 2020-09-29 RX ADMIN — OXYTOCIN 20 UNITS: 10 INJECTION, SOLUTION INTRAMUSCULAR; INTRAVENOUS at 05:20

## 2020-09-29 RX ADMIN — ESCITALOPRAM OXALATE 10 MG: 10 TABLET ORAL at 21:42

## 2020-09-29 RX ADMIN — SODIUM CHLORIDE, SODIUM LACTATE, POTASSIUM CHLORIDE, CALCIUM CHLORIDE AND DEXTROSE MONOHYDRATE: 5; 600; 310; 30; 20 INJECTION, SOLUTION INTRAVENOUS at 01:09

## 2020-09-29 RX ADMIN — DOCUSATE SODIUM 100 MG: 100 CAPSULE, LIQUID FILLED ORAL at 08:34

## 2020-09-29 RX ADMIN — FENTANYL CITRATE 100 MCG: 50 INJECTION INTRAMUSCULAR; INTRAVENOUS at 04:10

## 2020-09-29 RX ADMIN — SODIUM CHLORIDE, POTASSIUM CHLORIDE, SODIUM LACTATE AND CALCIUM CHLORIDE: 600; 310; 30; 20 INJECTION, SOLUTION INTRAVENOUS at 01:45

## 2020-09-29 RX ADMIN — OXYTOCIN 20 UNITS: 10 INJECTION, SOLUTION INTRAMUSCULAR; INTRAVENOUS at 05:21

## 2020-09-29 RX ADMIN — ANTACID TABLETS 1000 MG: 500 TABLET, CHEWABLE ORAL at 02:43

## 2020-09-29 RX ADMIN — MAGNESIUM SULFATE 2 G: 2 INJECTION INTRAVENOUS at 01:53

## 2020-09-29 RX ADMIN — MAGNESIUM SULFATE HEPTAHYDRATE 2 G/HR: 40 INJECTION, SOLUTION INTRAVENOUS at 02:25

## 2020-09-29 RX ADMIN — FENTANYL CITRATE 50 MCG: 50 INJECTION INTRAMUSCULAR; INTRAVENOUS at 03:23

## 2020-09-29 RX ADMIN — PRENATAL WITH FERROUS FUM AND FOLIC ACID 1 TABLET: 3080; 920; 120; 400; 22; 1.84; 3; 20; 10; 1; 12; 200; 27; 25; 2 TABLET ORAL at 08:34

## 2020-09-29 NOTE — LACTATION NOTE
MOB sleeping; FOB reports she is pumping and received a small amount which he took to NICU. Will return later to talk with MOB.

## 2020-09-29 NOTE — PROGRESS NOTES
S: No complaints.  No active bleeding.  O: Afebrile      EFM: Baseline 150 bpm.  Accelerations and moderate variability present. Occasional variable deceleration.  A: IUP 26 1/7 weeks      Placental abruption       labor, resolved.  P: Continue with observation.    Time: 15 minutes.

## 2020-09-29 NOTE — PROGRESS NOTES
"0337-Report from FELIPE Street RN. Pt transferred to S216. Pt stating, \"Fuck, fuck!\" during transfer  0350-Pt stated, \"I can't do this, what are you going to do for my pain?\" Phoned Dr. Wolf, updated on pt status, Dr. Wolf to be at bedside within a few minutes  0353-RN at bedside. Pt stated, \"I feel like the baby is coming, it's on my vagina!\" SVE C/+1. Dr. Wolf at bedside, confirms SVE, prepped pt for delivery. Charge RN aware  0400-VD of viable female infant, apgars 3/7, infant transferred to NICU. Orders to stop magnesium infusion (see MAR)  0542-Pt up to restroom, voided, pericare provided, gown changed  0559-Pt transferred to  via wheelchair. Report given to Ruben NAYAK. POC discussed  "

## 2020-09-29 NOTE — L&D DELIVERY NOTE
DATE OF SERVICE:  2020    PREDELIVERY DIAGNOSES:  1.  Intrauterine pregnancy at 26 weeks and 2 days.  2.  Placental abruption.  3.   labor.    POSTDELIVERY DIAGNOSES:  1.  Intrauterine pregnancy at 26 weeks and 2 days.  2.  Placental abruption.  3.   labor.    PROCEDURE:  Normal spontaneous vaginal delivery.    ANESTHESIA:  None.    PROCEDURE ITSELF:  The patient was assessed to be at 2 cm and complaining of   significant abdominal pain, nonresponsive to fentanyl IV 50 mg. The patient   was transferred to labor status and reexamination revealed the patient to be   completely dilated within an hour after the previous exam.  She was allowed to   push.  We did not have time sufficient to take her to the operating room and   delivered in the LDR.  She had a normal spontaneous vaginal delivery of   liveborn female, Apgars 3 and 7.  Delayed cord clamping was accomplished for   30 seconds.  IV magnesium sulfate that had been provided for neuro prophylaxis   was discontinued.  IV Pitocin was started.   team was attending   delivery.  Placental delivery was complicated by partial separation with mom   having continued bleeding and examination revealed the placenta to be   partially extruded into the vaginal vault; however, segment was still retained   fundally.  Ring forceps was introduced and gentle traction allowed removal of   the placenta and there appeared to be small fragments missing.  The examined   placenta showed abruptio placentae circumferentially on the edge of the   placenta.    Moderate uterine atony was encountered and responded to massage and IM   Methergine.  During this process, small placental fragments were removed.    Examination revealed the perineum to be intact.  There were no vaginal   lacerations.  Patient tolerated the procedure well.   mL.  The patient   is also O positive.  Infant was a liveborn female, Apgars 3 and 7, weight   ____.        ____________________________________     MD CHEN WOLFE    DD:  09/29/2020 04:34:44  DT:  09/29/2020 04:58:32    D#:  [id number]  Job#:  [id number]

## 2020-09-29 NOTE — PROGRESS NOTES
Called by RN pt with increased bleeding at this time.  EFM shows moderate variability and accelerations are present. Pt is very anxious.  Discussed with her and her partner situation does not require delivery at this time and to expect episodes of bleeding which are not predictable. Management is expectant.  Will keep her NPO for now and continuous monitoring. Will provide IV support.    Time: 25 minutes.

## 2020-09-29 NOTE — PROGRESS NOTES
"1900: Received report from Radha NAYAK; assuming care at this time  1950: Assessment complete. Pt denies contractions/LOF. States she had \"2 drops of old blood earlier in day\". No new bleeding. Pt reports +FM. Bowel sounds hypoactive- last BM yesterday. Pt denies HA/blurred vision/RUQ pain/SOB. Does confirm increased heartburn today, alleviated with Tums  1955: External monitors placed for 1 hr NST  2106: Reviewed FHT tracing with Dr Wolf: baseline 165, okay per gestational age. Endorsed 1 variable decel, approx 30 sec total with anna of 130; 1 decel of indeterminate category, with anna of 130. No contractions. To keep pt on continuous monitoring overnight per Dr Wolf.  2310: Pt to BR, noted new bleeding on pad. Approximately 1/4 of pad with blood, no clots. Updated Dr Wolf  0020: Pt reported \"this isn't working\"- r/t fetal monitor straps. Pt felt tense d/t \"having to stay still\". RN reiterated pt doesn't have to stay still, RN will move monitors as needed. C/o \"tightening in back, abdomen and legs\" d/t staying in same position. No contractions palpated. MD notified  0048: Pt up to BR; noted bright red bleeding on pad. Weighed per MD= 5 ml. Pt reports pain 5-6/10. Difficult contraction tracing, as pt is constantly moving. MD notified  0100: MD bedside discussing POC with pt  0125: Pt c/o expressed blood. QBL=15 ml. 2 quarter sized clots. MD notified  0130: Per MD- to start pt on Magnesium Sulfate 6 gm loading dose, 2 gm maintenance. Not necessary to start george catheter, as long as able to track output  0245: George catheter inserted  0315: Pt stating, \"I can't do this anymore\" d/t pain. Rates pain 9/10. Abd soft between contractions. MD notified, Fentanyl 50 mcg ordered. SVE 1-2/90/-1  0317: Updated Dr Wolf of SVE and pt pain, to see if Fentanyl works  0327: Pt denies any pain relief from Fentanyl. Updated Dr Wolf and requested MD evaluate pt.To transfer to LD  0337: Transferred pt to . Report to ROMAIN Spicer RN  "

## 2020-09-29 NOTE — L&D DELIVERY NOTE
Pt progressed from 2 cm to complete in about 1 hour.   of a liveborn female, Apgar 3 and 7.  Delayed cord clamping for 30 seconds.   team attended delivery.  Placenta delivery assisted due to partial separation and maternal bleeding.  Ring forceps used to assist the placental delivery.  Small fragments expelled follow by  Uterine atony.  Responded to methergine 0.2 mg IM.  No lacerations.  Patient is O positive.   ml.

## 2020-09-29 NOTE — DISCHARGE PLANNING
Discharge Planning Assessment Post Partum    Reason for Referral: NICU and history of depression  Address: [address] [place] [address] [place] [address]  Type of Living Situation: living with FOB  Mom Diagnosis: Pregnancy  Baby Diagnosis: NICU-26.2 weeks  Primary Language: English    Name of Baby: Kay Morales (: 20)  Father of the Baby: Pj Morales (phone# [phone])  Mother of the Baby: Tatiana Jarrell (phone# [phone])    Prenatal Care: Yes  Mom's PCP: Dr. Monet Miguel  PCP for new baby: Pediatrician list provided    Support System: FOB and MOB's mother will be arriving from [place] on Thursday  Coping/Bonding between mother & baby: Yes, visiting infant in the NICU  Source of Feeding: MOB is pumping  Supplies for Infant: parents are getting supplies for infant    Mom's Insurance: Medicaid FFS and COBRA-referral made PFA as FOB has questions about the insurance and paying for COBRA.    Baby Covered on Insurance:Yes  Mother Employed/School: MOB was laid off from [email or web address] due to COVID  Other children in the home/names & ages: no, first baby    Financial Hardship/Income: FOB is employed at [email or web address]   Mom's Mental status: alert and oriented  Services used prior to admit: Medicaid and may apply for [facility]    CPS History: No  Psychiatric History: history of depression.  Discussed with MOB who is taking Lexapro 10 mg.  Provided MOB with a list of counseling resources specializing in maternal mental health  Domestic Violence History: No  Drug/ETOH History: No, MOB's UDS is negative    Resources Provided: pediatrician list, children and family resource list, post partum support and counseling resources, information to the On license of [facility] and rules, application to apply for SSD for baby  Referrals Made: diaper bank referral provided and a referral was made for family to stay at the [facility]spoke with Miguel Angel who stated they do have a room available       Clearance for Discharge: Infant is cleared to discharge home with parents once medically cleared     Ongoing Plan:  will continue to follow and assist as needed.

## 2020-09-29 NOTE — PROGRESS NOTES
0610 Assumed care from labor and delivery. Oriented patient to room, call light, emergency light, TV, bed remote. Assessment completed, fundus firm, lochia light, POC reviewed, verbalized understanding. Denies pain at this time, will call if pain med is needed.     0630 MOB's IV infiltrated while running the 2nd bag of Pitocin. Called Dr. Persaud. MD ordered to discontinue 2nd bag of Pitocin and no order to insert new IV.

## 2020-09-29 NOTE — LACTATION NOTE
MOB reports that she is pumping every three hours and was able to get some drops earlier which were taken to NICU for her baby. Review settings starting @80 speed, lowering to 60 after two minutes, suction to comfort starting between 20-30%, and to pump for 15 minutes. Schedule taught to pump every 2 1/2 hours in the daytime, the taking a 5 hour rest between 8 or 9 pm to 1-2 am and always pumping two times between 1-6 am for a total of 8 x/24 hours. Teach hand expression and to follow each pumping session with 2-5 minutes of HE. When demo HE, MOB saw flow of drops into bottle. It has been an hour since she pumped. Helped her look up [facility] video on Hand Expression with encouragement to watch and then use technique for 2-5 on each side and then call FOB or RN to take to NICU. MOB voices understanding. Encouraged by drops seen with hand expression.

## 2020-09-30 LAB
ERYTHROCYTE [DISTWIDTH] IN BLOOD BY AUTOMATED COUNT: 45.8 FL (ref 35.9–50)
HCT VFR BLD AUTO: 33.4 % (ref 37–47)
HGB BLD-MCNC: 11.2 G/DL (ref 12–16)
MCH RBC QN AUTO: 32.9 PG (ref 27–33)
MCHC RBC AUTO-ENTMCNC: 33.5 G/DL (ref 33.6–35)
MCV RBC AUTO: 98.2 FL (ref 81.4–97.8)
PLATELET # BLD AUTO: 165 K/UL (ref 164–446)
PMV BLD AUTO: 10.9 FL (ref 9–12.9)
RBC # BLD AUTO: 3.4 M/UL (ref 4.2–5.4)
WBC # BLD AUTO: 11 K/UL (ref 4.8–10.8)

## 2020-09-30 PROCEDURE — 700102 HCHG RX REV CODE 250 W/ 637 OVERRIDE(OP): Performed by: OBSTETRICS & GYNECOLOGY

## 2020-09-30 PROCEDURE — 770002 HCHG ROOM/CARE - OB PRIVATE (112)

## 2020-09-30 PROCEDURE — 90471 IMMUNIZATION ADMIN: CPT

## 2020-09-30 PROCEDURE — 36415 COLL VENOUS BLD VENIPUNCTURE: CPT

## 2020-09-30 PROCEDURE — 90686 IIV4 VACC NO PRSV 0.5 ML IM: CPT | Performed by: OBSTETRICS & GYNECOLOGY

## 2020-09-30 PROCEDURE — A9270 NON-COVERED ITEM OR SERVICE: HCPCS | Performed by: OBSTETRICS & GYNECOLOGY

## 2020-09-30 PROCEDURE — 700111 HCHG RX REV CODE 636 W/ 250 OVERRIDE (IP): Performed by: OBSTETRICS & GYNECOLOGY

## 2020-09-30 PROCEDURE — 85027 COMPLETE CBC AUTOMATED: CPT

## 2020-09-30 RX ADMIN — IBUPROFEN 600 MG: 600 TABLET, FILM COATED ORAL at 10:44

## 2020-09-30 RX ADMIN — INFLUENZA A VIRUS A/GUANGDONG-MAONAN/SWL1536/2019 CNIC-1909 (H1N1) ANTIGEN (FORMALDEHYDE INACTIVATED), INFLUENZA A VIRUS A/HONG KONG/2671/2019 (H3N2) ANTIGEN (FORMALDEHYDE INACTIVATED), INFLUENZA B VIRUS B/PHUKET/3073/2013 ANTIGEN (FORMALDEHYDE INACTIVATED), AND INFLUENZA B VIRUS B/WASHINGTON/02/2019 ANTIGEN (FORMALDEHYDE INACTIVATED) 0.5 ML: 15; 15; 15; 15 INJECTION, SUSPENSION INTRAMUSCULAR at 03:49

## 2020-09-30 RX ADMIN — ESCITALOPRAM OXALATE 10 MG: 10 TABLET ORAL at 11:39

## 2020-09-30 RX ADMIN — IBUPROFEN 600 MG: 600 TABLET, FILM COATED ORAL at 20:23

## 2020-09-30 ASSESSMENT — EDINBURGH POSTNATAL DEPRESSION SCALE (EPDS)
I HAVE BEEN SO UNHAPPY THAT I HAVE HAD DIFFICULTY SLEEPING: NOT AT ALL
I HAVE BEEN SO UNHAPPY THAT I HAVE BEEN CRYING: NO, NEVER
I HAVE BLAMED MYSELF UNNECESSARILY WHEN THINGS WENT WRONG: NOT VERY OFTEN
I HAVE FELT SAD OR MISERABLE: NO, NOT AT ALL
THINGS HAVE BEEN GETTING ON TOP OF ME: NO, MOST OF THE TIME I HAVE COPED QUITE WELL
I HAVE LOOKED FORWARD WITH ENJOYMENT TO THINGS: AS MUCH AS I EVER DID
I HAVE BEEN ANXIOUS OR WORRIED FOR NO GOOD REASON: HARDLY EVER
I HAVE BEEN ABLE TO LAUGH AND SEE THE FUNNY SIDE OF THINGS: AS MUCH AS I ALWAYS COULD
THE THOUGHT OF HARMING MYSELF HAS OCCURRED TO ME: NEVER
I HAVE FELT SCARED OR PANICKY FOR NO GOOD REASON: NO, NOT MUCH

## 2020-09-30 ASSESSMENT — PAIN DESCRIPTION - PAIN TYPE
TYPE: ACUTE PAIN
TYPE: ACUTE PAIN

## 2020-09-30 NOTE — CARE PLAN
Problem: Communication  Goal: The ability to communicate needs accurately and effectively will improve  Outcome: PROGRESSING AS EXPECTED  Note: Reviewed plan of care with patient who verbalized understanding.      Problem: Altered physiologic condition related to immediate post-delivery state and potential for bleeding/hemorrhage  Goal: Patient physiologically stable as evidenced by normal lochia, palpable uterine involution and vital signs within normal limits  Outcome: PROGRESSING AS EXPECTED  Note: Fundus firm.  Lochia scant to light.  Vital signs WNL

## 2020-09-30 NOTE — DISCHARGE PLANNING
:    Madhav Flores-[employer] met with parents and answered their questions pertaining to insurance for MOB and infant.  MOB is covered under U[employer] (parents paid for COBRA) and Medicaid FFS.      Parents signed the hotel rules form for [facility] and SW emailed form to Tavia at [facility].  Spoke with Miguel Angel who confirmed that they will have a room available tomorrow for parents.  Miguel Angel will call parents to let them know.    Plan:  Continue to follow and assist as needed.

## 2020-09-30 NOTE — PROGRESS NOTES
2200 Pt doing well, Assessment done denies pain at this time, Encourage to call if she needs assistance, Needs attended.

## 2020-09-30 NOTE — PROGRESS NOTES
0701- Report received from NAEL Miranda.  Assumed care of patient.  Patient is sleeping.  0834- Patient assessment done.  Patient stated that she is voiding without difficulty and passing flatus.  Patient denied dizziness and stated that she is walking without difficulty.  Discussed pain management plan and patient prefers to call for pain intervention as needed.  Reviewed plan of care.  Patient verbalized understanding.  FOB at bedside.  Patient encouraged to use breast pump every 2 to 3 hours to stimulate breasts for milk production.  Patient verbalized understanding.  1100- Patient taken to NICU via wheelchair to visit infant.

## 2020-09-30 NOTE — LACTATION NOTE
MOB reports that now she is getting 5+ mls each time she she pumps and then hand expresses. Review pump schedule settings, and cleaning of parts. Discussion of pump rentals and MOB states that she is between [employer] insurance and Medicaid. Teach that with Medicaid she can apply for [facility] and can rent a HG pump on the in-between. Lactation contacted [facility] with personal info with permission of MOB and who will be contacting her for application. MOB voices understanding.

## 2020-10-01 VITALS
SYSTOLIC BLOOD PRESSURE: 117 MMHG | TEMPERATURE: 97.8 F | DIASTOLIC BLOOD PRESSURE: 74 MMHG | RESPIRATION RATE: 16 BRPM | WEIGHT: 141 LBS | BODY MASS INDEX: 23.49 KG/M2 | HEIGHT: 65 IN | HEART RATE: 70 BPM | OXYGEN SATURATION: 98 %

## 2020-10-01 PROBLEM — O47.00 PRETERM CONTRACTIONS: Status: RESOLVED | Noted: 2020-09-23 | Resolved: 2020-10-01

## 2020-10-01 PROBLEM — Z34.80 SUPERVISION OF OTHER NORMAL PREGNANCY, ANTEPARTUM: Status: RESOLVED | Noted: 2020-05-20 | Resolved: 2020-10-01

## 2020-10-01 RX ORDER — ESCITALOPRAM OXALATE 10 MG/1
10 TABLET ORAL DAILY
Qty: 90 TAB | Refills: 1 | Status: SHIPPED | OUTPATIENT
Start: 2020-10-01 | End: 2021-04-09 | Stop reason: SDUPTHER

## 2020-10-01 RX ORDER — IBUPROFEN 600 MG/1
600 TABLET ORAL EVERY 6 HOURS PRN
Qty: 30 TAB | Refills: 1 | Status: SHIPPED | OUTPATIENT
Start: 2020-10-01 | End: 2022-05-12

## 2020-10-01 RX ORDER — PSEUDOEPHEDRINE HCL 30 MG
100 TABLET ORAL 2 TIMES DAILY PRN
Qty: 60 CAP | Refills: 1 | Status: SHIPPED | OUTPATIENT
Start: 2020-10-01 | End: 2021-04-09

## 2020-10-01 ASSESSMENT — PAIN DESCRIPTION - PAIN TYPE: TYPE: ACUTE PAIN

## 2020-10-01 NOTE — PROGRESS NOTES
Discharged home .ambulated to car without difficultly. instructions given on self care and reasons to call the

## 2020-10-01 NOTE — PROGRESS NOTES
S: Feeling better.  O: Afebrile       Minimal bleeding.  Results for KATIE JOSEPH (MRN [id number]) as of 9/30/2020 19:41   Ref. Range 9/30/2020 06:26   WBC Latest Ref Range: 4.8 - 10.8 K/uL 11.0 (H)   RBC Latest Ref Range: 4.20 - 5.40 M/uL 3.40 (L)   Hemoglobin Latest Ref Range: 12.0 - 16.0 g/dL 11.2 (L)   Hematocrit Latest Ref Range: 37.0 - 47.0 % 33.4 (L)   MCV Latest Ref Range: 81.4 - 97.8 fL 98.2 (H)   MCH Latest Ref Range: 27.0 - 33.0 pg 32.9   MCHC Latest Ref Range: 33.6 - 35.0 g/dL 33.5 (L)   RDW Latest Ref Range: 35.9 - 50.0 fL 45.8   Platelet Count Latest Ref Range: 164 - 446 K/uL 165     A: PPD 1  P: Routine care.    Time: 15minutes

## 2020-10-01 NOTE — DISCHARGE INSTRUCTIONS
POSTPARTUM DISCHARGE INSTRUCTIONS FOR MOM    YOB: 1990   Age: 30 y.o.               Admit Date: 2020     Discharge Date: 10/1/2020  Attending Doctor:  Yeison Wolf M.D.                  Allergies:  Amoxicillin and Bactrim [sulfamethoxazole w-trimethoprim]    Discharged to home by car. Discharged via wheelchair, hospital escort: Yes.  Special equipment needed: Not Applicable  Belongings with: Personal  Be sure to schedule a follow-up appointment with your primary care doctor or any specialists as instructed.     Discharge Plan:   Diet Plan: Discussed  Activity Level: Discussed  Confirmed Follow up Appointment: Appointment Scheduled  Confirmed Symptoms Management: Discussed  Medication Reconciliation Updated: Yes  Influenza Vaccine Indication: Not indicated: Previously immunized this influenza season and > 8 years of age  Influenza Vaccine Given - only chart on this line when given: Influenza Vaccine Given (See MAR)    REASONS TO CALL YOUR OBSTETRICIAN:  1.   Persistent fever or shaking chills (Temperature higher than 100.4)  2.   Heavy bleeding (soaking more than 1 pad per hour); Passing clots  3.   Foul odor from vagina  4.   Mastitis (Breast infection; breast pain, chills, fever, redness)  5.   Urinary pain, burning or frequency  6.   Episiotomy infection  7.   Abdominal incision infection  8.   Severe depression longer than 24 hours    HAND WASHING  · Prior to handling the baby.  · Before breastfeeding or bottle feeding baby.  · After using the bathroom or changing the baby's diaper.    WOUND CARE  Ask your physician for additional care instructions.  In general:    ·  Incision:      · Keep clean and dry.    · Do NOT lift anything heavier than your baby for up to 6 weeks.    · There should not be any opening or pus.      VAGINAL CARE  · Nothing inside vagina for 6 weeks: no sexual intercourse, tampons or douching.  · Bleeding may continue for 2-4 weeks.  Amount may vary.    · Call your  "physician for heavy bleeding which means soaking more than 1 pad per hour    BIRTH CONTROL  · It is possible to become pregnant at any time after delivery and while breastfeeding.  · Plan to discuss a method of birth control with your physician at your follow up visit. visit.    DIET AND ELIMINATION  · Eating more fiber (bran cereal, fruits, and vegetables) and drinking plenty of fluids will help to avoid constipation.  · Urinary frequency after childbirth is normal.    POSTPARTUM BLUES  During the first few days after birth, you may experience a sense of the \"blues\" which may include impatience, irritability or even crying.  These feeling come and go quickly.  However, as many as 1 in 10 women experience emotional symptoms known as postpartum depression.    Postpartum depression:  May start as early as the second or third day after delivery or take several weeks or months to develop.  Symptoms of \"blues\" are present, but are more intense:  Crying spells; loss of appetite; feelings of hopelessness or loss of control; fear of touching the baby; over concern or no concern at all about the baby; little or no concern about your own appearance/caring for yourself; and/or inability to sleep or excessive sleeping.  Contact your physician if you are experiencing any of these symptoms.    Crisis Hotline:  · [place] Crisis Hotline:  9-492-CVAFAIM  Or 1-198.290.1903  · [place] Crisis Hotline:  1-871.469.1640  Or 494-675-2888    PREVENTING SHAKEN BABY:  If you are angry or stressed, PUT THE BABY IN THE CRIB, step away, take some deep breaths, and wait until you are calm to care for the baby.  DO NOT SHAKE THE BABY.  You are not alone, call a supporter for help.    · Crisis Call Center 24/7 crisis line 402-637-0549 or 1-246.873.9037  · You can also text them, text \"ANSWER\" to 780595    QUIT SMOKING/TOBACCO USE:  I understand the use of any tobacco products increases my chance of suffering from future heart disease and could " cause other illnesses which may shorten my life. Quitting the use of tobacco products is the single most important thing I can do to improve my health. For further information on smoking / tobacco cessation call a Toll Free Quit Line at 1-343.641.8063 (*National Cancer [place]) or 1-178.898.1544 (American Lung Association) or you can access the web based program at www.lungusa.org.    · [place] Tobacco Users Help Line:  (873) 821-7060       Toll Free: 1-954.891.6368  · Quit Tobacco Program [facility] (875)413-4624    DEPRESSION / SUICIDE RISK:  As you are discharged from this [facility], it is important to learn how to keep safe from harming yourself.    Recognize the warning signs:  · Abrupt changes in personality, positive or negative- including increase in energy   · Giving away possessions  · Change in eating patterns- significant weight changes-  positive or negative  · Change in sleeping patterns- unable to sleep or sleeping all the time   · Unwillingness or inability to communicate  · Depression  · Unusual sadness, discouragement and loneliness  · Talk of wanting to die  · Neglect of personal appearance   · Rebelliousness- reckless behavior  · Withdrawal from people/activities they love  · Confusion- inability to concentrate     If you or a loved one observes any of these behaviors or has concerns about self-harm, here's what you can do:  · Talk about it- your feelings and reasons for harming yourself  · Remove any means that you might use to hurt yourself (examples: pills, rope, extension cords, firearm)  · Get professional help from the community (Mental Health, Substance Abuse, psychological counseling)  · Do not be alone:Call your Safe Contact- someone whom you trust who will be there for you.  · Call your local CRISIS HOTLINE 393-8555 or 251-250-3946  · Call your local Children's Mobile Crisis Response Team [place] (753) 784-6438 or www.Decisionlink  · Call  the toll free National Suicide Prevention Hotlines   · National Suicide Prevention Lifeline 374-815-CTXK (9156)  · National Hope Line Network 800-SUICIDE (741-3611)    DISCHARGE SURVEY:  Thank you for choosing [facility].  We hope we provided you with very good care.  You may be receiving a survey in the mail.  Please fill it out.  Your opinion is valuable to us.    ADDITIONAL EDUCATIONAL MATERIALS GIVEN TO PATIENT:        My signature on this form indicates that:  1.  I have reviewed and understand the above information  2.  My questions regarding this information have been answered to my satisfaction.  3.  I have formulated a plan with my discharge nurse to obtain my prescribed medication for home.

## 2020-10-01 NOTE — CARE PLAN
Problem: Alteration in comfort related to episiotomy, vaginal repair and/or after birth pains  Goal: Patient is able to ambulate, care for self and infant  Intervention: Administer pain meds as requested by patient and ordered by MD/DO/CNM  Note: Pain medicine given as requested

## 2020-10-01 NOTE — PROGRESS NOTES
2023 Pt doing well, Assessment done complained of mild abdominal pain medicated her as per doctor orders, needs attended.

## 2020-10-01 NOTE — DISCHARGE SUMMARY
Discharge Summary:      Tatiana Jarrell    Admit Date:   2020  Discharge Date:  10/1/2020     Admitting diagnosis:  Pregnancy @ 26 weeks 2 day; Placental abruption;  birth   Indication for care in labor and delivery, antepartum  Discharge Diagnosis: Status post vaginal, spontaneous.  Pregnancy Complications: Placental Abruption and  birth   Tubal Ligation:  no        History:  Past Medical History:   Diagnosis Date   • Patient denies medical problems    • Recurrent major depressive disorder, in remission ([facility]) 2020     OB History    Para Term  AB Living   2 1   1 1 1   SAB TAB Ectopic Molar Multiple Live Births   1       0 1      # Outcome Date GA Lbr [place]/2nd Weight Sex Delivery Anes PTL Lv   2  20 26w2d 00:34 / 00:07 0.787 kg (1 lb 11.8 oz) F Vag-Spont None Y NEGRA      Complications: Abruptio Placenta   1 SAB 20 9w1d               Amoxicillin and Bactrim [sulfamethoxazole w-trimethoprim]  Patient Active Problem List    Diagnosis Date Noted   • Recurrent candidiasis of vagina 2020   • Cervical polyp 2020   • Recurrent major depressive disorder, in remission ([facility]) 2020   • Reactive hypoglycemia 05/10/2016        Hospital Course:   30 y.o. , now para 1, was admitted with the above mentioned diagnosis, underwent Antepartum Admission for placental abruption, vaginal, spontaneous. Patient postpartum course was unremarkable, with progressive advancement in diet , ambulation and toleration of oral analgesia. Patient without complaints today and desires discharge.      Vitals:    20 1800 20 0600 20 1800 10/01/20 0600   BP: 106/64 105/66 110/70 117/74   Pulse: 70 61 77 70   Resp: 16 16 18 16   Temp: 36.4 °C (97.6 °F) 36.6 °C (97.8 °F) 36.7 °C (98.1 °F) 36.6 °C (97.8 °F)   TempSrc: Temporal Temporal Temporal Temporal   SpO2: 96% 99% 98% 98%   Weight:       Height:           Current Facility-Administered Medications   Medication Dose    • D5LR infusion     • LR infusion     • tetanus-dipth-acell pertussis (Tdap) inj 0.5 mL  0.5 mL   • measles, mumps and rubella vaccine (MMR) injection 0.5 mL  0.5 mL   • methylergonovine (METHERGINE) injection 0.2 mg  0.2 mg   • bisacodyl (DULCOLAX) suppository 10 mg  10 mg   • docusate sodium (COLACE) capsule 100 mg  100 mg   • ibuprofen (MOTRIN) tablet 600 mg  600 mg   • mag hydrox-al hydrox-simeth (MAALOX PLUS ES or MYLANTA DS) suspension 10 mL  10 mL   • escitalopram (LEXAPRO) tablet 10 mg  10 mg       Exam:  Breast Exam: negative  Abdomen: Abdomen soft, non-tender. BS normal. No masses,  No organomegaly  Fundus Non Tender: yes  Incision: none  Perineum: perineum intact  Extremity: extremities, peripheral pulses and reflexes normal     Labs:  Recent Labs     09/29/20  1307 09/30/20  0626   WBC 16.2* 11.0*   RBC 3.54* 3.40*   HEMOGLOBIN 11.6* 11.2*   HEMATOCRIT 34.6* 33.4*   MCV 97.7 98.2*   MCH 32.8 32.9   MCHC 33.5* 33.5*   RDW 45.6 45.8   PLATELETCT 158* 165   MPV 11.2 10.9        Activity:   Discharge to home  Pelvic Rest x 6 weeks    Assessment:  Normal postpartum course  Bleeding light   Pumping  Pain well managed with Motrin  +BM but issues with constipation in pregnancy  No complaints.   Patient is Rh positive and does not require Rhogam prior to discharge.      Follow up:   Our office in 2 weeks. Call for an appointment      Discharge Meds:   Current Outpatient Medications   Medication Sig Dispense Refill   • docusate sodium 100 MG Cap Take 100 mg by mouth 2 times a day as needed for Constipation. 60 Cap 1   • ibuprofen (MOTRIN) 600 MG Tab Take 1 Tab by mouth every 6 hours as needed for Moderate Pain. 30 Tab 1   • escitalopram (LEXAPRO) 10 MG Tab Take 1 Tab by mouth every day. 90 Tab 1       CAMI Muñoz, SABRINA     Total time: 30 minutes

## 2020-11-28 ENCOUNTER — HOSPITAL ENCOUNTER (EMERGENCY)
Facility: MEDICAL CENTER | Age: 30
End: 2020-11-28
Attending: EMERGENCY MEDICINE
Payer: MEDICAID

## 2020-11-28 VITALS
SYSTOLIC BLOOD PRESSURE: 111 MMHG | HEIGHT: 65 IN | RESPIRATION RATE: 20 BRPM | OXYGEN SATURATION: 96 % | TEMPERATURE: 96.6 F | WEIGHT: 141.09 LBS | HEART RATE: 84 BPM | DIASTOLIC BLOOD PRESSURE: 82 MMHG | BODY MASS INDEX: 23.51 KG/M2

## 2020-11-28 DIAGNOSIS — N61.0 MASTITIS: ICD-10-CM

## 2020-11-28 LAB
ANION GAP SERPL CALC-SCNC: 11 MMOL/L (ref 7–16)
APPEARANCE UR: CLEAR
BACTERIA #/AREA URNS HPF: NEGATIVE /HPF
BASOPHILS # BLD AUTO: 0.6 % (ref 0–1.8)
BASOPHILS # BLD: 0.06 K/UL (ref 0–0.12)
BILIRUB UR QL STRIP.AUTO: NEGATIVE
BUN SERPL-MCNC: 13 MG/DL (ref 8–22)
CALCIUM SERPL-MCNC: 10.1 MG/DL (ref 8.5–10.5)
CHLORIDE SERPL-SCNC: 103 MMOL/L (ref 96–112)
CO2 SERPL-SCNC: 23 MMOL/L (ref 20–33)
COLOR UR: YELLOW
COVID ORDER STATUS COVID19: NORMAL
CREAT SERPL-MCNC: 0.81 MG/DL (ref 0.5–1.4)
EOSINOPHIL # BLD AUTO: 0.2 K/UL (ref 0–0.51)
EOSINOPHIL NFR BLD: 2.2 % (ref 0–6.9)
EPI CELLS #/AREA URNS HPF: ABNORMAL /HPF
ERYTHROCYTE [DISTWIDTH] IN BLOOD BY AUTOMATED COUNT: 40.4 FL (ref 35.9–50)
FLUAV RNA SPEC QL NAA+PROBE: NEGATIVE
FLUBV RNA SPEC QL NAA+PROBE: NEGATIVE
GLUCOSE SERPL-MCNC: 110 MG/DL (ref 65–99)
GLUCOSE UR STRIP.AUTO-MCNC: NEGATIVE MG/DL
HCT VFR BLD AUTO: 40.6 % (ref 37–47)
HGB BLD-MCNC: 13.7 G/DL (ref 12–16)
HYALINE CASTS #/AREA URNS LPF: ABNORMAL /LPF
IMM GRANULOCYTES # BLD AUTO: 0.02 K/UL (ref 0–0.11)
IMM GRANULOCYTES NFR BLD AUTO: 0.2 % (ref 0–0.9)
KETONES UR STRIP.AUTO-MCNC: NEGATIVE MG/DL
LACTATE BLD-SCNC: 1.3 MMOL/L (ref 0.5–2)
LEUKOCYTE ESTERASE UR QL STRIP.AUTO: NEGATIVE
LYMPHOCYTES # BLD AUTO: 1.77 K/UL (ref 1–4.8)
LYMPHOCYTES NFR BLD: 19.2 % (ref 22–41)
MCH RBC QN AUTO: 31.6 PG (ref 27–33)
MCHC RBC AUTO-ENTMCNC: 33.7 G/DL (ref 33.6–35)
MCV RBC AUTO: 93.5 FL (ref 81.4–97.8)
MICRO URNS: ABNORMAL
MONOCYTES # BLD AUTO: 0.99 K/UL (ref 0–0.85)
MONOCYTES NFR BLD AUTO: 10.7 % (ref 0–13.4)
NEUTROPHILS # BLD AUTO: 6.2 K/UL (ref 2–7.15)
NEUTROPHILS NFR BLD: 67.1 % (ref 44–72)
NITRITE UR QL STRIP.AUTO: NEGATIVE
NRBC # BLD AUTO: 0 K/UL
NRBC BLD-RTO: 0 /100 WBC
PH UR STRIP.AUTO: 7.5 [PH] (ref 5–8)
PLATELET # BLD AUTO: 260 K/UL (ref 164–446)
PMV BLD AUTO: 10.3 FL (ref 9–12.9)
POTASSIUM SERPL-SCNC: 3.6 MMOL/L (ref 3.6–5.5)
PROT UR QL STRIP: NEGATIVE MG/DL
RBC # BLD AUTO: 4.34 M/UL (ref 4.2–5.4)
RBC # URNS HPF: ABNORMAL /HPF
RBC UR QL AUTO: ABNORMAL
RSV RNA SPEC QL NAA+PROBE: NEGATIVE
SARS-COV-2 RNA RESP QL NAA+PROBE: NOTDETECTED
SODIUM SERPL-SCNC: 137 MMOL/L (ref 135–145)
SP GR UR STRIP.AUTO: 1.01
SPECIMEN SOURCE: NORMAL
UROBILINOGEN UR STRIP.AUTO-MCNC: 0.2 MG/DL
WBC # BLD AUTO: 9.2 K/UL (ref 4.8–10.8)
WBC #/AREA URNS HPF: ABNORMAL /HPF

## 2020-11-28 PROCEDURE — 85025 COMPLETE CBC W/AUTO DIFF WBC: CPT

## 2020-11-28 PROCEDURE — 87205 SMEAR GRAM STAIN: CPT

## 2020-11-28 PROCEDURE — 96365 THER/PROPH/DIAG IV INF INIT: CPT | Mod: EDC

## 2020-11-28 PROCEDURE — 80048 BASIC METABOLIC PNL TOTAL CA: CPT

## 2020-11-28 PROCEDURE — 99284 EMERGENCY DEPT VISIT MOD MDM: CPT | Mod: EDC

## 2020-11-28 PROCEDURE — 87070 CULTURE OTHR SPECIMN AEROBIC: CPT | Mod: XU

## 2020-11-28 PROCEDURE — 36415 COLL VENOUS BLD VENIPUNCTURE: CPT | Mod: EDC

## 2020-11-28 PROCEDURE — 96375 TX/PRO/DX INJ NEW DRUG ADDON: CPT | Mod: EDC

## 2020-11-28 PROCEDURE — 83605 ASSAY OF LACTIC ACID: CPT

## 2020-11-28 PROCEDURE — 96366 THER/PROPH/DIAG IV INF ADDON: CPT | Mod: EDC

## 2020-11-28 PROCEDURE — 0241U HCHG SARS-COV-2 COVID-19 NFCT DS RESP RNA 4 TRGT MIC: CPT

## 2020-11-28 PROCEDURE — 700105 HCHG RX REV CODE 258: Performed by: EMERGENCY MEDICINE

## 2020-11-28 PROCEDURE — 81001 URINALYSIS AUTO W/SCOPE: CPT

## 2020-11-28 PROCEDURE — 87086 URINE CULTURE/COLONY COUNT: CPT

## 2020-11-28 PROCEDURE — 87040 BLOOD CULTURE FOR BACTERIA: CPT | Mod: 91

## 2020-11-28 PROCEDURE — 700111 HCHG RX REV CODE 636 W/ 250 OVERRIDE (IP): Performed by: EMERGENCY MEDICINE

## 2020-11-28 RX ORDER — MORPHINE SULFATE 4 MG/ML
4 INJECTION, SOLUTION INTRAMUSCULAR; INTRAVENOUS ONCE
Status: COMPLETED | OUTPATIENT
Start: 2020-11-28 | End: 2020-11-28

## 2020-11-28 RX ORDER — ONDANSETRON 2 MG/ML
4 INJECTION INTRAMUSCULAR; INTRAVENOUS ONCE
Status: COMPLETED | OUTPATIENT
Start: 2020-11-28 | End: 2020-11-28

## 2020-11-28 RX ORDER — DIPHENHYDRAMINE HYDROCHLORIDE 50 MG/ML
25 INJECTION INTRAMUSCULAR; INTRAVENOUS ONCE
Status: COMPLETED | OUTPATIENT
Start: 2020-11-28 | End: 2020-11-28

## 2020-11-28 RX ORDER — HYDROCODONE BITARTRATE AND ACETAMINOPHEN 5; 325 MG/1; MG/1
1-2 TABLET ORAL EVERY 6 HOURS PRN
Qty: 15 TAB | Refills: 0 | Status: SHIPPED | OUTPATIENT
Start: 2020-11-28 | End: 2020-11-30

## 2020-11-28 RX ORDER — SODIUM CHLORIDE, SODIUM LACTATE, POTASSIUM CHLORIDE, AND CALCIUM CHLORIDE .6; .31; .03; .02 G/100ML; G/100ML; G/100ML; G/100ML
1000 INJECTION, SOLUTION INTRAVENOUS ONCE
Status: COMPLETED | OUTPATIENT
Start: 2020-11-28 | End: 2020-11-28

## 2020-11-28 RX ADMIN — MORPHINE SULFATE 4 MG: 4 INJECTION INTRAVENOUS at 07:33

## 2020-11-28 RX ADMIN — ONDANSETRON 4 MG: 2 INJECTION INTRAMUSCULAR; INTRAVENOUS at 07:33

## 2020-11-28 RX ADMIN — DIPHENHYDRAMINE HYDROCHLORIDE 25 MG: 50 INJECTION INTRAMUSCULAR; INTRAVENOUS at 09:19

## 2020-11-28 RX ADMIN — VANCOMYCIN HYDROCHLORIDE 1500 MG: 500 INJECTION, POWDER, LYOPHILIZED, FOR SOLUTION INTRAVENOUS at 08:04

## 2020-11-28 RX ADMIN — SODIUM CHLORIDE, POTASSIUM CHLORIDE, SODIUM LACTATE AND CALCIUM CHLORIDE 1000 ML: 600; 310; 30; 20 INJECTION, SOLUTION INTRAVENOUS at 07:33

## 2020-11-28 ASSESSMENT — PAIN DESCRIPTION - PAIN TYPE: TYPE: ACUTE PAIN

## 2020-11-28 ASSESSMENT — FIBROSIS 4 INDEX: FIB4 SCORE: 0.74

## 2020-11-28 NOTE — ED TRIAGE NOTES
.  Chief Complaint   Patient presents with   • Breast Pain     pt was dx on Wednesday with mastitis, given abx. pt states pain has got worse since then and has moved from left breast to being in both.        Pt ambulatory to triage. Pt alert and oriented. Pt educated on triage process and to update staff if any changes. Pt placed back into lobby.

## 2020-11-28 NOTE — ED NOTES
Report received from NAEL Orantes. Assumed care of patient. Updated on plan of care. Call bell within reach

## 2020-11-28 NOTE — ED NOTES
Breast Pump provided to patient. Culture obtained from left breast.   Updated on plan of care. Call bell within reach

## 2020-11-28 NOTE — ED NOTES
Rounded on pt, updated on plan of care.  Waiting on breast pump to be delivered.   Pt reports reduced breast pain.  Call bell within reach.

## 2020-11-28 NOTE — ED PROVIDER NOTES
"ED Provider Note    CHIEF COMPLAINT  Chief Complaint   Patient presents with   • Breast Pain     pt was dx on Wednesday with mastitis, given abx. pt states pain has got worse since then and has moved from left breast to being in both.        HPI  Tatiana Jarrell is a 30 y.o. female who presents to the emergency department with bilateral breast pain.  Patient recently postpartum.  Developed pain in her left breast 3 days ago.  Was seen that day by her OB/GYN.  Was prescribed clindamycin.  She took this Thursday and Friday but noticed that today she is now having pain in her right breast in addition to the left and she has increased swelling and discomfort on the left.  She is continue to pump throughout this.  Her pain is characterized as throbbing and nonradiating.  She has had body aches and chills.  No documented temperature.  Denies cough, congestion, runny nose or sore throat.  No Covid exposure.    REVIEW OF SYSTEMS  As per HPI, otherwise a 10 point review of systems is negative    PAST MEDICAL HISTORY  Past Medical History:   Diagnosis Date   • Patient denies medical problems    • Recurrent major depressive disorder, in remission (HCC) 1/8/2020       SOCIAL HISTORY  Social History     Tobacco Use   • Smoking status: Never Smoker   • Smokeless tobacco: Never Used   Substance Use Topics   • Alcohol use: No   • Drug use: No       SURGICAL HISTORY  Past Surgical History:   Procedure Laterality Date   • HERNIA REPAIR Left        CURRENT MEDICATIONS  Home Medications    **Home medications have not yet been reviewed for this encounter**         ALLERGIES  Allergies   Allergen Reactions   • Amoxicillin Hives     Breaks out in hives   • Bactrim [Sulfamethoxazole W-Trimethoprim] Hives     Breaks out in hives       PHYSICAL EXAM  VITAL SIGNS: /93   Pulse 88   Temp 35.9 °C (96.6 °F) (Temporal)   Resp 20   Ht 1.651 m (5' 5\")   Wt 64 kg (141 lb 1.5 oz)   LMP 03/29/2020 (Exact Date)   SpO2 99%   BMI 23.48 kg/m²  "   Constitutional: Awake and alert  HENT: Normal inspection  Eyes: Normal inspection  Neck: Supple  Cardiovascular: Normal heart rate, Normal rhythm.  Symmetric peripheral pulses.   Thorax & Lungs: No respiratory distress, No wheezing, No rales, No rhonchi, there is redness over the inferior aspect of the left breast and fullness of the breast.  There is no palpable distinct mass or abscess.  There is no redness of the right breast, but there is diffuse tenderness.  Skin: As noted above  Neurologic: Grossly normal   Psychiatric: Anxious appearing        Labs:  Results for orders placed or performed during the hospital encounter of 11/28/20   CBC WITH DIFFERENTIAL   Result Value Ref Range    WBC 9.2 4.8 - 10.8 K/uL    RBC 4.34 4.20 - 5.40 M/uL    Hemoglobin 13.7 12.0 - 16.0 g/dL    Hematocrit 40.6 37.0 - 47.0 %    MCV 93.5 81.4 - 97.8 fL    MCH 31.6 27.0 - 33.0 pg    MCHC 33.7 33.6 - 35.0 g/dL    RDW 40.4 35.9 - 50.0 fL    Platelet Count 260 164 - 446 K/uL    MPV 10.3 9.0 - 12.9 fL    Neutrophils-Polys 67.10 44.00 - 72.00 %    Lymphocytes 19.20 (L) 22.00 - 41.00 %    Monocytes 10.70 0.00 - 13.40 %    Eosinophils 2.20 0.00 - 6.90 %    Basophils 0.60 0.00 - 1.80 %    Immature Granulocytes 0.20 0.00 - 0.90 %    Nucleated RBC 0.00 /100 WBC    Neutrophils (Absolute) 6.20 2.00 - 7.15 K/uL    Lymphs (Absolute) 1.77 1.00 - 4.80 K/uL    Monos (Absolute) 0.99 (H) 0.00 - 0.85 K/uL    Eos (Absolute) 0.20 0.00 - 0.51 K/uL    Baso (Absolute) 0.06 0.00 - 0.12 K/uL    Immature Granulocytes (abs) 0.02 0.00 - 0.11 K/uL    NRBC (Absolute) 0.00 K/uL   BASIC METABOLIC PANEL   Result Value Ref Range    Sodium 137 135 - 145 mmol/L    Potassium 3.6 3.6 - 5.5 mmol/L    Chloride 103 96 - 112 mmol/L    Co2 23 20 - 33 mmol/L    Glucose 110 (H) 65 - 99 mg/dL    Bun 13 8 - 22 mg/dL    Creatinine 0.81 0.50 - 1.40 mg/dL    Calcium 10.1 8.5 - 10.5 mg/dL    Anion Gap 11.0 7.0 - 16.0   COVID/SARS CoV-2 PCR    Specimen: Nasopharyngeal; Respirate    Result Value Ref Range    COVID Order Status Received    LACTIC ACID   Result Value Ref Range    Lactic Acid 1.3 0.5 - 2.0 mmol/L   URINALYSIS    Specimen: Urine   Result Value Ref Range    Color Yellow     Character Clear     Specific Gravity 1.009 <1.035    Ph 7.5 5.0 - 8.0    Glucose Negative Negative mg/dL    Ketones Negative Negative mg/dL    Protein Negative Negative mg/dL    Bilirubin Negative Negative    Urobilinogen, Urine 0.2 Negative    Nitrite Negative Negative    Leukocyte Esterase Negative Negative    Occult Blood Small (A) Negative    Micro Urine Req Microscopic    URINE MICROSCOPIC (W/UA)   Result Value Ref Range    WBC 0-2 /hpf    RBC 0-2 /hpf    Bacteria Negative None /hpf    Epithelial Cells Few /hpf    Hyaline Cast 3-5 (A) /lpf   ESTIMATED GFR   Result Value Ref Range    GFR If African American >60 >60 mL/min/1.73 m 2    GFR If Non African American >60 >60 mL/min/1.73 m 2   CoV-2, Flu A/B, And RSV by PCR   Result Value Ref Range    Influenza virus A RNA Negative Negative    Influenza virus B, PCR Negative Negative    RSV, PCR Negative Negative    SARS-CoV-2 by PCR NotDetected NotDetected    SARS-CoV-2 Source NP Swab        Medications   vancomycin (VANCOCIN) 1,500 mg in  mL IVPB (1,500 mg Intravenous New Bag 11/28/20 0804)   lactated ringers infusion (BOLUS) (1,000 mL Intravenous New Bag 11/28/20 0733)   morphine (pf) 4 mg/mL injection 4 mg (4 mg Intravenous Given 11/28/20 0733)   ondansetron (ZOFRAN) syringe/vial injection 4 mg (4 mg Intravenous Given 11/28/20 0733)       COURSE & MEDICAL DECISION MAKING  Patient presents to the ER with breast pain.  She does have redness of the left breast with out any palpable abscess or other alarming abnormalities.  There is no redness of the right breast.  She has significant discomfort is given morphine and Zofran.  Her symptoms were improved.  She was hydrated with lactated Ringer's.  Given that she has been on clindamycin I ordered vancomycin  intravenously while work-up was undertaken.  She had body aches and chills and there is a possibility of admission therefore rapid Covid was sent.    Data returned reassuring.  No leukocytosis.  Patient's pain was better after treatment.  Her vital signs are stable.  I paged patient's midwife Alley Valera.  Discussed with the provider who evaluated the patient on Wednesday and started the clindamycin.  Given the patient's clinical stability without alarming findings on data or exam it seems reasonable to continue the patient's clindamycin.  The patient already has a prescription for Diflucan as candidal infection is a possibility.  Patient is already been given instructions on caring for mastitis.  We will obtain culture of breastmilk.    Discussed plan for discharge and follow-up this upcoming Tuesday in the clinic.  Patient will watch for high fevers or worsening return to the ER.    FINAL IMPRESSION  1.  Mastitis      This dictation was created using voice recognition software. The accuracy of the dictation is limited to the abilities of the software.  The nursing notes were reviewed and certain aspects of this information were incorporated into this note.      Electronically signed by: Chadd Pablo M.D., 11/28/2020 8:19 AM

## 2020-11-28 NOTE — ED NOTES
Provided patient with discharge instructions and prescription. Pt verbalized understanding. Pt ambulatory to exit with steady gait.

## 2020-11-29 LAB
GRAM STN SPEC: NORMAL
SIGNIFICANT IND 70042: NORMAL
SITE SITE: NORMAL
SOURCE SOURCE: NORMAL

## 2020-11-29 NOTE — CONSULTS
"Met with MOB for a lactation consult.  MOB stated she is receiving medications for treatment of mastitis.  MOB with questions on how to effectively remove milk from breasts.  Infant is in the NICU due to prematurity at delivery.      Provided MOB with pumping plan.  She reported she has been pumping every 2-3 hours, but stated she slept through pumping \"a couple of times\" and went approximately 6 hours without pumping.  Encouraged MOB to pump every 2-3 hours with one 5 hour stretch at night between two pumping sessions to allow for sleep.    Engorgement measures provided which included applying warm heat 5 minutes prior to and during pumping sessions, cool packs to breasts as instructed, taking Ibuprofen as prescribed for inflammation, and pumping/hand expressing to comfort between pumping sessions.    MOB asked about the safety of clindamycin and morphine with breastfeeding.  MOB encouraged to refer this question to MD, but was informed per Torsten Ugarte's book, Medications and Mothers' Milk, these medications are not categorized as an L4 or L5 medication which would make the medications contraindicated with breastfeeding.     MOB stated she has been using the wrong size flanges unknowingly.  She was encouraged to schedule an appointment with Lactation to have flange fit assessed when she is in NICU visiting infant.  ANTHONY has been using 30 mm flanges with her personal breast pump and the 25 mm flanges from the hospital grade breast pump provided to her today appear appropriate.  MOB stated the suction from the hospital grade breast is stronger than her personal breast pump and she stated she yielded more breast milk just now pumping with the hospital grade breast pump. ANTHONY received approximately 3 oz of expressed breast milk (1.5 oz from each breast).  ANTHONY was again encouraged to rent a hospital grade breast pump from the [facility] and she was encouraged to take all pump parts home with her.    MOB stated " she has not applied for WIC and was again encouraged to do so because of the lactation benefits that would be available to her should she qualify for the program.    MOB verbalized understanding of all information provided to her and denied having any further questions at this time.  Encouraged MOB to call for lactation assistance as needed.

## 2020-11-30 LAB
BACTERIA UR CULT: NORMAL
BACTERIA WND AEROBE CULT: NORMAL
GRAM STN SPEC: NORMAL
SIGNIFICANT IND 70042: NORMAL
SIGNIFICANT IND 70042: NORMAL
SITE SITE: NORMAL
SITE SITE: NORMAL
SOURCE SOURCE: NORMAL
SOURCE SOURCE: NORMAL

## 2020-12-03 LAB
BACTERIA BLD CULT: NORMAL
BACTERIA BLD CULT: NORMAL
SIGNIFICANT IND 70042: NORMAL
SIGNIFICANT IND 70042: NORMAL
SITE SITE: NORMAL
SITE SITE: NORMAL
SOURCE SOURCE: NORMAL
SOURCE SOURCE: NORMAL

## 2020-12-08 ENCOUNTER — HOSPITAL ENCOUNTER (OUTPATIENT)
Dept: RADIOLOGY | Facility: MEDICAL CENTER | Age: 30
End: 2020-12-08
Payer: MEDICAID

## 2022-07-12 PROBLEM — N30.00 ACUTE CYSTITIS WITHOUT HEMATURIA: Status: ACTIVE | Noted: 2022-07-12

## 2024-01-30 PROBLEM — Z87.51 HISTORY OF PRETERM DELIVERY: Status: ACTIVE | Noted: 2024-01-30

## 2024-02-24 PROBLEM — R82.71 ASYMPTOMATIC BACTERIURIA DURING PREGNANCY: Status: ACTIVE | Noted: 2024-02-24

## 2024-02-24 PROBLEM — O99.891 ASYMPTOMATIC BACTERIURIA DURING PREGNANCY: Status: ACTIVE | Noted: 2024-02-24

## 2024-02-26 PROBLEM — O09.899 MATERNAL VARICELLA, NON-IMMUNE: Status: ACTIVE | Noted: 2024-02-26

## 2024-02-26 PROBLEM — Z28.39 MATERNAL VARICELLA, NON-IMMUNE: Status: ACTIVE | Noted: 2024-02-26
